# Patient Record
Sex: FEMALE | Race: WHITE | Employment: OTHER | URBAN - METROPOLITAN AREA
[De-identification: names, ages, dates, MRNs, and addresses within clinical notes are randomized per-mention and may not be internally consistent; named-entity substitution may affect disease eponyms.]

---

## 2017-03-12 PROBLEM — T82.514A HICKMAN CATHETER DYSFUNCTION (HCC): Status: ACTIVE | Noted: 2017-03-12

## 2017-05-17 PROBLEM — J18.9 HCAP (HEALTHCARE-ASSOCIATED PNEUMONIA): Status: ACTIVE | Noted: 2017-05-17

## 2017-05-17 PROBLEM — N39.0 UTI (URINARY TRACT INFECTION): Status: ACTIVE | Noted: 2017-05-17

## 2018-01-01 ENCOUNTER — HOSPITAL ENCOUNTER (INPATIENT)
Age: 23
LOS: 17 days | Discharge: NURSING, REHAB OR LONG-TERM CARE | DRG: 466 | End: 2018-09-11
Attending: EMERGENCY MEDICINE | Admitting: INTERNAL MEDICINE
Payer: MEDICAID

## 2018-01-01 ENCOUNTER — APPOINTMENT (OUTPATIENT)
Dept: GENERAL RADIOLOGY | Age: 23
DRG: 466 | End: 2018-01-01
Payer: MEDICAID

## 2018-01-01 ENCOUNTER — APPOINTMENT (OUTPATIENT)
Dept: ULTRASOUND IMAGING | Age: 23
DRG: 466 | End: 2018-01-01
Payer: MEDICAID

## 2018-01-01 ENCOUNTER — HOSPITAL ENCOUNTER (OUTPATIENT)
Dept: INTERVENTIONAL RADIOLOGY/VASCULAR | Age: 23
Discharge: HOME | End: 2018-07-13
Payer: COMMERCIAL

## 2018-01-01 ENCOUNTER — HOSPITAL ENCOUNTER (EMERGENCY)
Age: 23
End: 2018-09-19
Attending: EMERGENCY MEDICINE
Payer: MEDICAID

## 2018-01-01 VITALS
DIASTOLIC BLOOD PRESSURE: 65 MMHG | RESPIRATION RATE: 16 BRPM | SYSTOLIC BLOOD PRESSURE: 98 MMHG | HEART RATE: 97 BPM | OXYGEN SATURATION: 97 % | TEMPERATURE: 97.5 F

## 2018-01-01 VITALS
TEMPERATURE: 97.8 F | DIASTOLIC BLOOD PRESSURE: 100 MMHG | BODY MASS INDEX: 15.3 KG/M2 | WEIGHT: 97.5 LBS | RESPIRATION RATE: 16 BRPM | OXYGEN SATURATION: 98 % | HEART RATE: 99 BPM | SYSTOLIC BLOOD PRESSURE: 134 MMHG | HEIGHT: 67 IN

## 2018-01-01 DIAGNOSIS — I46.9 CARDIAC ARREST (HCC): Primary | ICD-10-CM

## 2018-01-01 DIAGNOSIS — S31.000D WOUND OF SACRAL REGION, SUBSEQUENT ENCOUNTER: ICD-10-CM

## 2018-01-01 DIAGNOSIS — I87.8 POOR VENOUS ACCESS: ICD-10-CM

## 2018-01-01 DIAGNOSIS — N30.01 ACUTE CYSTITIS WITH HEMATURIA: Primary | ICD-10-CM

## 2018-01-01 LAB
6AM URINE: <10 NG/ML
ACETAMINOPHEN LEVEL: <5 MCG/ML (ref 10–30)
ALBUMIN SERPL-MCNC: 2.3 G/DL (ref 3.5–5.2)
ALBUMIN SERPL-MCNC: 2.8 G/DL (ref 3.5–5.2)
ALP BLD-CCNC: 68 U/L (ref 35–104)
ALP BLD-CCNC: 83 U/L (ref 35–104)
ALT SERPL-CCNC: 27 U/L (ref 0–32)
ALT SERPL-CCNC: 9 U/L (ref 0–32)
AMPHETAMINE SCREEN, URINE: NOT DETECTED
AMPHETAMINE SCREEN, URINE: NOT DETECTED
ANION GAP SERPL CALCULATED.3IONS-SCNC: 10 MMOL/L (ref 7–16)
ANION GAP SERPL CALCULATED.3IONS-SCNC: 11 MMOL/L (ref 7–16)
ANION GAP SERPL CALCULATED.3IONS-SCNC: 11 MMOL/L (ref 7–16)
ANION GAP SERPL CALCULATED.3IONS-SCNC: 12 MMOL/L (ref 7–16)
ANISOCYTOSIS: ABNORMAL
AST SERPL-CCNC: 12 U/L (ref 0–31)
AST SERPL-CCNC: 25 U/L (ref 0–31)
BACTERIA: ABNORMAL /HPF
BARBITURATE SCREEN URINE: NOT DETECTED
BARBITURATE SCREEN URINE: NOT DETECTED
BASOPHILS ABSOLUTE: 0.01 E9/L (ref 0–0.2)
BASOPHILS ABSOLUTE: 0.03 E9/L (ref 0–0.2)
BASOPHILS ABSOLUTE: 0.03 E9/L (ref 0–0.2)
BASOPHILS ABSOLUTE: 0.04 E9/L (ref 0–0.2)
BASOPHILS ABSOLUTE: 0.04 E9/L (ref 0–0.2)
BASOPHILS RELATIVE PERCENT: 0.1 % (ref 0–2)
BASOPHILS RELATIVE PERCENT: 0.3 % (ref 0–2)
BASOPHILS RELATIVE PERCENT: 0.4 % (ref 0–2)
BASOPHILS RELATIVE PERCENT: 0.5 % (ref 0–2)
BASOPHILS RELATIVE PERCENT: 0.5 % (ref 0–2)
BENZODIAZEPINE SCREEN, URINE: NOT DETECTED
BENZODIAZEPINE SCREEN, URINE: NOT DETECTED
BILIRUB SERPL-MCNC: 0.3 MG/DL (ref 0–1.2)
BILIRUB SERPL-MCNC: <0.2 MG/DL (ref 0–1.2)
BILIRUBIN URINE: NEGATIVE
BLOOD CULTURE, ROUTINE: NORMAL
BLOOD, URINE: ABNORMAL
BUN BLDV-MCNC: 15 MG/DL (ref 6–20)
BUN BLDV-MCNC: 17 MG/DL (ref 6–20)
BUN BLDV-MCNC: 20 MG/DL (ref 6–20)
BUN BLDV-MCNC: 22 MG/DL (ref 6–20)
C-REACTIVE PROTEIN: 4.2 MG/DL (ref 0–0.4)
C-REACTIVE PROTEIN: 6 MG/DL (ref 0–0.4)
CALCIUM SERPL-MCNC: 8.6 MG/DL (ref 8.6–10.2)
CALCIUM SERPL-MCNC: 8.8 MG/DL (ref 8.6–10.2)
CALCIUM SERPL-MCNC: 8.8 MG/DL (ref 8.6–10.2)
CALCIUM SERPL-MCNC: 9 MG/DL (ref 8.6–10.2)
CANNABINOID SCREEN URINE: NOT DETECTED
CANNABINOID SCREEN URINE: NOT DETECTED
CHLORIDE BLD-SCNC: 101 MMOL/L (ref 98–107)
CHLORIDE BLD-SCNC: 101 MMOL/L (ref 98–107)
CHLORIDE BLD-SCNC: 103 MMOL/L (ref 98–107)
CHLORIDE BLD-SCNC: 97 MMOL/L (ref 98–107)
CLARITY: ABNORMAL
CO2: 24 MMOL/L (ref 22–29)
CO2: 25 MMOL/L (ref 22–29)
CO2: 26 MMOL/L (ref 22–29)
CO2: 28 MMOL/L (ref 22–29)
COCAINE METABOLITE SCREEN URINE: NOT DETECTED
COCAINE METABOLITE SCREEN URINE: NOT DETECTED
CODEINE, URINE: <20 NG/ML
COLOR: ABNORMAL
CORTISOL TOTAL: 2.94 MCG/DL (ref 2.68–18.4)
CORTISOL TOTAL: 20.05 MCG/DL (ref 2.68–18.4)
CREAT SERPL-MCNC: 0.3 MG/DL (ref 0.5–1)
CREAT SERPL-MCNC: 0.4 MG/DL (ref 0.5–1)
CULTURE, BLOOD 2: NORMAL
D DIMER: 642 NG/ML DDU
EKG ATRIAL RATE: 84 BPM
EKG P AXIS: 77 DEGREES
EKG P-R INTERVAL: 124 MS
EKG Q-T INTERVAL: 344 MS
EKG QRS DURATION: 76 MS
EKG QTC CALCULATION (BAZETT): 406 MS
EKG R AXIS: 70 DEGREES
EKG T AXIS: 92 DEGREES
EKG VENTRICULAR RATE: 84 BPM
EOSINOPHILS ABSOLUTE: 0 E9/L (ref 0.05–0.5)
EOSINOPHILS ABSOLUTE: 0.18 E9/L (ref 0.05–0.5)
EOSINOPHILS ABSOLUTE: 0.24 E9/L (ref 0.05–0.5)
EOSINOPHILS ABSOLUTE: 0.28 E9/L (ref 0.05–0.5)
EOSINOPHILS ABSOLUTE: 0.31 E9/L (ref 0.05–0.5)
EOSINOPHILS RELATIVE PERCENT: 0 % (ref 0–6)
EOSINOPHILS RELATIVE PERCENT: 2.2 % (ref 0–6)
EOSINOPHILS RELATIVE PERCENT: 2.8 % (ref 0–6)
EOSINOPHILS RELATIVE PERCENT: 3.2 % (ref 0–6)
EOSINOPHILS RELATIVE PERCENT: 3.5 % (ref 0–6)
ETHANOL: <10 MG/DL (ref 0–0.08)
FERRITIN: 457 NG/ML
FOLATE: 7.2 NG/ML (ref 4.8–24.2)
GFR AFRICAN AMERICAN: >60
GFR NON-AFRICAN AMERICAN: >60 ML/MIN/1.73
GLUCOSE BLD-MCNC: 113 MG/DL (ref 74–109)
GLUCOSE BLD-MCNC: 81 MG/DL (ref 74–109)
GLUCOSE BLD-MCNC: 94 MG/DL (ref 74–109)
GLUCOSE BLD-MCNC: 95 MG/DL (ref 74–109)
GLUCOSE URINE: NEGATIVE MG/DL
HCT VFR BLD CALC: 28.4 % (ref 34–48)
HCT VFR BLD CALC: 30.5 % (ref 34–48)
HCT VFR BLD CALC: 32.6 % (ref 34–48)
HCT VFR BLD CALC: 33.3 % (ref 34–48)
HCT VFR BLD CALC: 36.5 % (ref 34–48)
HCT VFR BLD CALC: 36.8 % (ref 34–48)
HEMOGLOBIN: 10.7 G/DL (ref 11.5–15.5)
HEMOGLOBIN: 10.8 G/DL (ref 11.5–15.5)
HEMOGLOBIN: 8.3 G/DL (ref 11.5–15.5)
HEMOGLOBIN: 8.9 G/DL (ref 11.5–15.5)
HEMOGLOBIN: 9.6 G/DL (ref 11.5–15.5)
HEMOGLOBIN: 9.9 G/DL (ref 11.5–15.5)
HYDROCODONE, URINE: <20 NG/ML
HYDROMORPHONE, URINE: <20 NG/ML
HYPOCHROMIA: ABNORMAL
IMMATURE GRANULOCYTES #: 0.02 E9/L
IMMATURE GRANULOCYTES #: 0.02 E9/L
IMMATURE GRANULOCYTES #: 0.03 E9/L
IMMATURE GRANULOCYTES #: 0.03 E9/L
IMMATURE GRANULOCYTES #: 0.04 E9/L
IMMATURE GRANULOCYTES %: 0.2 % (ref 0–5)
IMMATURE GRANULOCYTES %: 0.2 % (ref 0–5)
IMMATURE GRANULOCYTES %: 0.3 % (ref 0–5)
IMMATURE GRANULOCYTES %: 0.4 % (ref 0–5)
IMMATURE GRANULOCYTES %: 0.4 % (ref 0–5)
IRON SATURATION: 19 % (ref 15–50)
IRON: 44 MCG/DL (ref 37–145)
KETONES, URINE: NEGATIVE MG/DL
LACTIC ACID: 1.9 MMOL/L (ref 0.5–2.2)
LEUKOCYTE ESTERASE, URINE: ABNORMAL
LV EF: 55 %
LVEF MODALITY: NORMAL
LYMPHOCYTES ABSOLUTE: 2.41 E9/L (ref 1.5–4)
LYMPHOCYTES ABSOLUTE: 2.89 E9/L (ref 1.5–4)
LYMPHOCYTES ABSOLUTE: 2.93 E9/L (ref 1.5–4)
LYMPHOCYTES ABSOLUTE: 3.13 E9/L (ref 1.5–4)
LYMPHOCYTES ABSOLUTE: 3.14 E9/L (ref 1.5–4)
LYMPHOCYTES RELATIVE PERCENT: 23.2 % (ref 20–42)
LYMPHOCYTES RELATIVE PERCENT: 32.6 % (ref 20–42)
LYMPHOCYTES RELATIVE PERCENT: 33.3 % (ref 20–42)
LYMPHOCYTES RELATIVE PERCENT: 37 % (ref 20–42)
LYMPHOCYTES RELATIVE PERCENT: 39.1 % (ref 20–42)
MAGNESIUM: 2 MG/DL (ref 1.6–2.6)
MCH RBC QN AUTO: 25.1 PG (ref 26–35)
MCH RBC QN AUTO: 25.4 PG (ref 26–35)
MCH RBC QN AUTO: 25.5 PG (ref 26–35)
MCH RBC QN AUTO: 25.7 PG (ref 26–35)
MCH RBC QN AUTO: 25.7 PG (ref 26–35)
MCH RBC QN AUTO: 25.9 PG (ref 26–35)
MCHC RBC AUTO-ENTMCNC: 28.8 % (ref 32–34.5)
MCHC RBC AUTO-ENTMCNC: 29.1 % (ref 32–34.5)
MCHC RBC AUTO-ENTMCNC: 29.2 % (ref 32–34.5)
MCHC RBC AUTO-ENTMCNC: 29.2 % (ref 32–34.5)
MCHC RBC AUTO-ENTMCNC: 29.6 % (ref 32–34.5)
MCHC RBC AUTO-ENTMCNC: 30.4 % (ref 32–34.5)
MCV RBC AUTO: 84 FL (ref 80–99.9)
MCV RBC AUTO: 86.7 FL (ref 80–99.9)
MCV RBC AUTO: 86.9 FL (ref 80–99.9)
MCV RBC AUTO: 87.2 FL (ref 80–99.9)
MCV RBC AUTO: 88.2 FL (ref 80–99.9)
MCV RBC AUTO: 88.5 FL (ref 80–99.9)
METHADONE SCREEN, URINE: NOT DETECTED
METHADONE SCREEN, URINE: NOT DETECTED
MONOCYTES ABSOLUTE: 0.35 E9/L (ref 0.1–0.95)
MONOCYTES ABSOLUTE: 0.44 E9/L (ref 0.1–0.95)
MONOCYTES ABSOLUTE: 0.54 E9/L (ref 0.1–0.95)
MONOCYTES ABSOLUTE: 0.58 E9/L (ref 0.1–0.95)
MONOCYTES ABSOLUTE: 0.73 E9/L (ref 0.1–0.95)
MONOCYTES RELATIVE PERCENT: 4.2 % (ref 2–12)
MONOCYTES RELATIVE PERCENT: 4.4 % (ref 2–12)
MONOCYTES RELATIVE PERCENT: 6 % (ref 2–12)
MONOCYTES RELATIVE PERCENT: 6.7 % (ref 2–12)
MONOCYTES RELATIVE PERCENT: 8.6 % (ref 2–12)
MORPHINE URINE: <20 NG/ML
NEUTROPHILS ABSOLUTE: 4.3 E9/L (ref 1.8–7.3)
NEUTROPHILS ABSOLUTE: 4.32 E9/L (ref 1.8–7.3)
NEUTROPHILS ABSOLUTE: 4.88 E9/L (ref 1.8–7.3)
NEUTROPHILS ABSOLUTE: 5.14 E9/L (ref 1.8–7.3)
NEUTROPHILS ABSOLUTE: 7.47 E9/L (ref 1.8–7.3)
NEUTROPHILS RELATIVE PERCENT: 50.7 % (ref 43–80)
NEUTROPHILS RELATIVE PERCENT: 53.7 % (ref 43–80)
NEUTROPHILS RELATIVE PERCENT: 56.1 % (ref 43–80)
NEUTROPHILS RELATIVE PERCENT: 57.3 % (ref 43–80)
NEUTROPHILS RELATIVE PERCENT: 72.1 % (ref 43–80)
NITRITE, URINE: POSITIVE
NORHYDROCODONE, URINE: <20 NG/ML
NOROXYCODONE, URINE: 424 NG/ML
NOROXYMORPHONE, URINE: 115 NG/ML
OPIATE SCREEN URINE: POSITIVE
OPIATE SCREEN URINE: POSITIVE
ORGANISM: ABNORMAL
OXYCODONE, URINE CONFIRMATION: 91 NG/ML
OXYMORPHONE, URINE: <20 NG/ML
PATHOLOGIST REVIEW: NORMAL
PDW BLD-RTO: 17.6 FL (ref 11.5–15)
PDW BLD-RTO: 17.7 FL (ref 11.5–15)
PDW BLD-RTO: 17.7 FL (ref 11.5–15)
PDW BLD-RTO: 17.8 FL (ref 11.5–15)
PDW BLD-RTO: 17.8 FL (ref 11.5–15)
PDW BLD-RTO: 17.9 FL (ref 11.5–15)
PH UA: 6.5 (ref 5–9)
PHENCYCLIDINE SCREEN URINE: NOT DETECTED
PHENCYCLIDINE SCREEN URINE: NOT DETECTED
PLATELET # BLD: 373 E9/L (ref 130–450)
PLATELET # BLD: 399 E9/L (ref 130–450)
PLATELET # BLD: 426 E9/L (ref 130–450)
PLATELET # BLD: 458 E9/L (ref 130–450)
PLATELET # BLD: 469 E9/L (ref 130–450)
PLATELET # BLD: 526 E9/L (ref 130–450)
PMV BLD AUTO: 8.5 FL (ref 7–12)
PMV BLD AUTO: 8.6 FL (ref 7–12)
PMV BLD AUTO: 8.9 FL (ref 7–12)
PMV BLD AUTO: 8.9 FL (ref 7–12)
PMV BLD AUTO: 9 FL (ref 7–12)
PMV BLD AUTO: 9 FL (ref 7–12)
POTASSIUM REFLEX MAGNESIUM: 4.6 MMOL/L (ref 3.5–5)
POTASSIUM SERPL-SCNC: 3.4 MMOL/L (ref 3.5–5)
POTASSIUM SERPL-SCNC: 3.9 MMOL/L (ref 3.5–5)
POTASSIUM SERPL-SCNC: 4.9 MMOL/L (ref 3.5–5)
PROCALCITONIN: 0.04 NG/ML (ref 0–0.08)
PROPOXYPHENE SCREEN: NOT DETECTED
PROPOXYPHENE SCREEN: NOT DETECTED
PROTEIN UA: 100 MG/DL
RBC # BLD: 3.21 E12/L (ref 3.5–5.5)
RBC # BLD: 3.51 E12/L (ref 3.5–5.5)
RBC # BLD: 3.82 E12/L (ref 3.5–5.5)
RBC # BLD: 3.88 E12/L (ref 3.5–5.5)
RBC # BLD: 4.17 E12/L (ref 3.5–5.5)
RBC # BLD: 4.21 E12/L (ref 3.5–5.5)
RBC UA: >20 /HPF (ref 0–2)
SALICYLATE, SERUM: <0.3 MG/DL (ref 0–30)
SEDIMENTATION RATE, ERYTHROCYTE: 118 MM/HR (ref 0–20)
SEDIMENTATION RATE, ERYTHROCYTE: 132 MM/HR (ref 0–20)
SODIUM BLD-SCNC: 135 MMOL/L (ref 132–146)
SODIUM BLD-SCNC: 138 MMOL/L (ref 132–146)
SPECIFIC GRAVITY UA: 1.02 (ref 1–1.03)
TOTAL IRON BINDING CAPACITY: 231 MCG/DL (ref 250–450)
TOTAL PROTEIN: 7.2 G/DL (ref 6.4–8.3)
TOTAL PROTEIN: 8.1 G/DL (ref 6.4–8.3)
TRICYCLIC ANTIDEPRESSANTS SCREEN SERUM: NEGATIVE NG/ML
URINE CULTURE, ROUTINE: ABNORMAL
UROBILINOGEN, URINE: 0.2 E.U./DL
VANCOMYCIN RANDOM: 13.2 MCG/ML (ref 5–40)
VITAMIN B-12: 416 PG/ML (ref 211–946)
VRE CULTURE: ABNORMAL
VRE CULTURE: ABNORMAL
WBC # BLD: 10.4 E9/L (ref 4.5–11.5)
WBC # BLD: 13.2 E9/L (ref 4.5–11.5)
WBC # BLD: 8 E9/L (ref 4.5–11.5)
WBC # BLD: 8.5 E9/L (ref 4.5–11.5)
WBC # BLD: 8.7 E9/L (ref 4.5–11.5)
WBC # BLD: 9 E9/L (ref 4.5–11.5)
WBC UA: >20 /HPF (ref 0–5)
WOUND/ABSCESS: ABNORMAL
WOUND/ABSCESS: NORMAL
WOUND/ABSCESS: NORMAL

## 2018-01-01 PROCEDURE — 2580000003 HC RX 258: Performed by: INTERNAL MEDICINE

## 2018-01-01 PROCEDURE — 6370000000 HC RX 637 (ALT 250 FOR IP)

## 2018-01-01 PROCEDURE — 99232 SBSQ HOSP IP/OBS MODERATE 35: CPT | Performed by: INTERNAL MEDICINE

## 2018-01-01 PROCEDURE — 82728 ASSAY OF FERRITIN: CPT

## 2018-01-01 PROCEDURE — 80307 DRUG TEST PRSMV CHEM ANLYZR: CPT

## 2018-01-01 PROCEDURE — 2580000003 HC RX 258

## 2018-01-01 PROCEDURE — 99232 SBSQ HOSP IP/OBS MODERATE 35: CPT | Performed by: HOSPITALIST

## 2018-01-01 PROCEDURE — 82607 VITAMIN B-12: CPT

## 2018-01-01 PROCEDURE — 6370000000 HC RX 637 (ALT 250 FOR IP): Performed by: HOSPITALIST

## 2018-01-01 PROCEDURE — 6360000002 HC RX W HCPCS: Performed by: NURSE PRACTITIONER

## 2018-01-01 PROCEDURE — 6370000000 HC RX 637 (ALT 250 FOR IP): Performed by: STUDENT IN AN ORGANIZED HEALTH CARE EDUCATION/TRAINING PROGRAM

## 2018-01-01 PROCEDURE — 6370000000 HC RX 637 (ALT 250 FOR IP): Performed by: SPECIALIST

## 2018-01-01 PROCEDURE — APPSS30 APP SPLIT SHARED TIME 16-30 MINUTES: Performed by: NURSE PRACTITIONER

## 2018-01-01 PROCEDURE — 1200000000 HC SEMI PRIVATE

## 2018-01-01 PROCEDURE — 2580000003 HC RX 258: Performed by: HOSPITALIST

## 2018-01-01 PROCEDURE — 84145 PROCALCITONIN (PCT): CPT

## 2018-01-01 PROCEDURE — 6370000000 HC RX 637 (ALT 250 FOR IP): Performed by: INTERNAL MEDICINE

## 2018-01-01 PROCEDURE — 36415 COLL VENOUS BLD VENIPUNCTURE: CPT

## 2018-01-01 PROCEDURE — 87040 BLOOD CULTURE FOR BACTERIA: CPT

## 2018-01-01 PROCEDURE — 2500000003 HC RX 250 WO HCPCS

## 2018-01-01 PROCEDURE — 99233 SBSQ HOSP IP/OBS HIGH 50: CPT | Performed by: INTERNAL MEDICINE

## 2018-01-01 PROCEDURE — 2580000003 HC RX 258: Performed by: NURSE PRACTITIONER

## 2018-01-01 PROCEDURE — 87077 CULTURE AEROBIC IDENTIFY: CPT

## 2018-01-01 PROCEDURE — 6360000002 HC RX W HCPCS

## 2018-01-01 PROCEDURE — 93005 ELECTROCARDIOGRAM TRACING: CPT | Performed by: NURSE PRACTITIONER

## 2018-01-01 PROCEDURE — G0480 DRUG TEST DEF 1-7 CLASSES: HCPCS

## 2018-01-01 PROCEDURE — 80053 COMPREHEN METABOLIC PANEL: CPT

## 2018-01-01 PROCEDURE — 97161 PT EVAL LOW COMPLEX 20 MIN: CPT

## 2018-01-01 PROCEDURE — 85651 RBC SED RATE NONAUTOMATED: CPT

## 2018-01-01 PROCEDURE — 87070 CULTURE OTHR SPECIMN AEROBIC: CPT

## 2018-01-01 PROCEDURE — 6370000000 HC RX 637 (ALT 250 FOR IP): Performed by: EMERGENCY MEDICINE

## 2018-01-01 PROCEDURE — 6360000002 HC RX W HCPCS: Performed by: STUDENT IN AN ORGANIZED HEALTH CARE EDUCATION/TRAINING PROGRAM

## 2018-01-01 PROCEDURE — 80048 BASIC METABOLIC PNL TOTAL CA: CPT

## 2018-01-01 PROCEDURE — 81001 URINALYSIS AUTO W/SCOPE: CPT

## 2018-01-01 PROCEDURE — 85025 COMPLETE CBC W/AUTO DIFF WBC: CPT

## 2018-01-01 PROCEDURE — 97165 OT EVAL LOW COMPLEX 30 MIN: CPT

## 2018-01-01 PROCEDURE — 83605 ASSAY OF LACTIC ACID: CPT

## 2018-01-01 PROCEDURE — 6360000002 HC RX W HCPCS: Performed by: INTERNAL MEDICINE

## 2018-01-01 PROCEDURE — 36556 INSERT NON-TUNNEL CV CATH: CPT | Performed by: RADIOLOGY

## 2018-01-01 PROCEDURE — 85027 COMPLETE CBC AUTOMATED: CPT

## 2018-01-01 PROCEDURE — 83550 IRON BINDING TEST: CPT

## 2018-01-01 PROCEDURE — 83735 ASSAY OF MAGNESIUM: CPT

## 2018-01-01 PROCEDURE — C1751 CATH, INF, PER/CENT/MIDLINE: HCPCS

## 2018-01-01 PROCEDURE — 2580000003 HC RX 258: Performed by: EMERGENCY MEDICINE

## 2018-01-01 PROCEDURE — 99223 1ST HOSP IP/OBS HIGH 75: CPT | Performed by: INTERNAL MEDICINE

## 2018-01-01 PROCEDURE — 86140 C-REACTIVE PROTEIN: CPT

## 2018-01-01 PROCEDURE — 99284 EMERGENCY DEPT VISIT MOD MDM: CPT

## 2018-01-01 PROCEDURE — 87186 SC STD MICRODIL/AGAR DIL: CPT

## 2018-01-01 PROCEDURE — 6360000002 HC RX W HCPCS: Performed by: EMERGENCY MEDICINE

## 2018-01-01 PROCEDURE — 99239 HOSP IP/OBS DSCHRG MGMT >30: CPT | Performed by: INTERNAL MEDICINE

## 2018-01-01 PROCEDURE — 93970 EXTREMITY STUDY: CPT

## 2018-01-01 PROCEDURE — 99291 CRITICAL CARE FIRST HOUR: CPT | Performed by: INTERNAL MEDICINE

## 2018-01-01 PROCEDURE — 85378 FIBRIN DEGRADE SEMIQUANT: CPT

## 2018-01-01 PROCEDURE — 82533 TOTAL CORTISOL: CPT

## 2018-01-01 PROCEDURE — 76937 US GUIDE VASCULAR ACCESS: CPT | Performed by: RADIOLOGY

## 2018-01-01 PROCEDURE — 97530 THERAPEUTIC ACTIVITIES: CPT

## 2018-01-01 PROCEDURE — 2500000003 HC RX 250 WO HCPCS: Performed by: RADIOLOGY

## 2018-01-01 PROCEDURE — 2580000003 HC RX 258: Performed by: STUDENT IN AN ORGANIZED HEALTH CARE EDUCATION/TRAINING PROGRAM

## 2018-01-01 PROCEDURE — 99999 PR OFFICE/OUTPT VISIT,PROCEDURE ONLY: CPT | Performed by: HOSPITALIST

## 2018-01-01 PROCEDURE — 99285 EMERGENCY DEPT VISIT HI MDM: CPT

## 2018-01-01 PROCEDURE — 2500000003 HC RX 250 WO HCPCS: Performed by: STUDENT IN AN ORGANIZED HEALTH CARE EDUCATION/TRAINING PROGRAM

## 2018-01-01 PROCEDURE — 77001 FLUOROGUIDE FOR VEIN DEVICE: CPT | Performed by: RADIOLOGY

## 2018-01-01 PROCEDURE — 83540 ASSAY OF IRON: CPT

## 2018-01-01 PROCEDURE — S0028 INJECTION, FAMOTIDINE, 20 MG: HCPCS | Performed by: STUDENT IN AN ORGANIZED HEALTH CARE EDUCATION/TRAINING PROGRAM

## 2018-01-01 PROCEDURE — 82746 ASSAY OF FOLIC ACID SERUM: CPT

## 2018-01-01 PROCEDURE — 99222 1ST HOSP IP/OBS MODERATE 55: CPT | Performed by: PHYSICIAN ASSISTANT

## 2018-01-01 PROCEDURE — 93306 TTE W/DOPPLER COMPLETE: CPT

## 2018-01-01 PROCEDURE — 87081 CULTURE SCREEN ONLY: CPT

## 2018-01-01 PROCEDURE — 87088 URINE BACTERIA CULTURE: CPT

## 2018-01-01 PROCEDURE — G8981 BODY POS CURRENT STATUS: HCPCS

## 2018-01-01 PROCEDURE — 71045 X-RAY EXAM CHEST 1 VIEW: CPT

## 2018-01-01 PROCEDURE — 92950 HEART/LUNG RESUSCITATION CPR: CPT

## 2018-01-01 PROCEDURE — G8982 BODY POS GOAL STATUS: HCPCS

## 2018-01-01 PROCEDURE — 99232 SBSQ HOSP IP/OBS MODERATE 35: CPT | Performed by: PHYSICIAN ASSISTANT

## 2018-01-01 PROCEDURE — 80202 ASSAY OF VANCOMYCIN: CPT

## 2018-01-01 RX ORDER — 0.9 % SODIUM CHLORIDE 0.9 %
500 INTRAVENOUS SOLUTION INTRAVENOUS ONCE
Status: COMPLETED | OUTPATIENT
Start: 2018-01-01 | End: 2018-01-01

## 2018-01-01 RX ORDER — MIDODRINE HYDROCHLORIDE 5 MG/1
5 TABLET ORAL ONCE
Status: DISCONTINUED | OUTPATIENT
Start: 2018-01-01 | End: 2018-01-01

## 2018-01-01 RX ORDER — MORPHINE SULFATE 2 MG/ML
1 INJECTION, SOLUTION INTRAMUSCULAR; INTRAVENOUS ONCE
Status: DISCONTINUED | OUTPATIENT
Start: 2018-01-01 | End: 2018-01-01

## 2018-01-01 RX ORDER — SODIUM CHLORIDE 0.9 % (FLUSH) 0.9 %
10 SYRINGE (ML) INJECTION PRN
Status: DISCONTINUED | OUTPATIENT
Start: 2018-01-01 | End: 2018-01-01 | Stop reason: HOSPADM

## 2018-01-01 RX ORDER — DIPHENHYDRAMINE HYDROCHLORIDE 50 MG/ML
25 INJECTION INTRAMUSCULAR; INTRAVENOUS ONCE
Status: COMPLETED | OUTPATIENT
Start: 2018-01-01 | End: 2018-01-01

## 2018-01-01 RX ORDER — CEFDINIR 300 MG/1
300 CAPSULE ORAL EVERY 12 HOURS SCHEDULED
Status: DISCONTINUED | OUTPATIENT
Start: 2018-01-01 | End: 2018-01-01

## 2018-01-01 RX ORDER — FOLIC ACID 1 MG/1
1 TABLET ORAL DAILY
Status: DISCONTINUED | OUTPATIENT
Start: 2018-01-01 | End: 2018-01-01 | Stop reason: HOSPADM

## 2018-01-01 RX ORDER — VENLAFAXINE 75 MG/1
75 TABLET ORAL 2 TIMES DAILY
Status: DISCONTINUED | OUTPATIENT
Start: 2018-01-01 | End: 2018-01-01 | Stop reason: HOSPADM

## 2018-01-01 RX ORDER — FERROUS SULFATE 325(65) MG
325 TABLET ORAL 2 TIMES DAILY WITH MEALS
Status: DISCONTINUED | OUTPATIENT
Start: 2018-01-01 | End: 2018-01-01

## 2018-01-01 RX ORDER — POTASSIUM CHLORIDE 7.45 MG/ML
10 INJECTION INTRAVENOUS ONCE
Status: COMPLETED | OUTPATIENT
Start: 2018-01-01 | End: 2018-01-01

## 2018-01-01 RX ORDER — DIPHENHYDRAMINE HCL 25 MG
25 TABLET ORAL EVERY 6 HOURS PRN
Qty: 30 TABLET | Refills: 0 | DISCHARGE
Start: 2018-01-01 | End: 2018-10-11

## 2018-01-01 RX ORDER — NALOXONE HYDROCHLORIDE 0.4 MG/ML
0.4 INJECTION, SOLUTION INTRAMUSCULAR; INTRAVENOUS; SUBCUTANEOUS PRN
Status: DISCONTINUED | OUTPATIENT
Start: 2018-01-01 | End: 2018-01-01 | Stop reason: HOSPADM

## 2018-01-01 RX ORDER — SODIUM CHLORIDE 9 MG/ML
INJECTION, SOLUTION INTRAVENOUS CONTINUOUS
Status: DISCONTINUED | OUTPATIENT
Start: 2018-01-01 | End: 2018-01-01

## 2018-01-01 RX ORDER — MIDODRINE HYDROCHLORIDE 5 MG/1
5 TABLET ORAL ONCE
Status: COMPLETED | OUTPATIENT
Start: 2018-01-01 | End: 2018-01-01

## 2018-01-01 RX ORDER — ACETAMINOPHEN 325 MG/1
650 TABLET ORAL EVERY 4 HOURS PRN
Status: DISCONTINUED | OUTPATIENT
Start: 2018-01-01 | End: 2018-01-01 | Stop reason: HOSPADM

## 2018-01-01 RX ORDER — METHYLPREDNISOLONE SODIUM SUCCINATE 40 MG/ML
40 INJECTION, POWDER, LYOPHILIZED, FOR SOLUTION INTRAMUSCULAR; INTRAVENOUS ONCE
Status: COMPLETED | OUTPATIENT
Start: 2018-01-01 | End: 2018-01-01

## 2018-01-01 RX ORDER — BACLOFEN 10 MG/1
10 TABLET ORAL ONCE
Status: COMPLETED | OUTPATIENT
Start: 2018-01-01 | End: 2018-01-01

## 2018-01-01 RX ORDER — M-VIT,TX,IRON,MINS/CALC/FOLIC 27MG-0.4MG
1 TABLET ORAL DAILY
Status: DISCONTINUED | OUTPATIENT
Start: 2018-01-01 | End: 2018-01-01 | Stop reason: HOSPADM

## 2018-01-01 RX ORDER — AMOXICILLIN AND CLAVULANATE POTASSIUM 400; 57 MG/5ML; MG/5ML
800 POWDER, FOR SUSPENSION ORAL EVERY 12 HOURS SCHEDULED
Status: DISCONTINUED | OUTPATIENT
Start: 2018-01-01 | End: 2018-01-01

## 2018-01-01 RX ORDER — MIDODRINE HYDROCHLORIDE 5 MG/1
5 TABLET ORAL DAILY
Status: DISCONTINUED | OUTPATIENT
Start: 2018-01-01 | End: 2018-01-01

## 2018-01-01 RX ORDER — ONDANSETRON 4 MG/1
4 TABLET, FILM COATED ORAL EVERY 4 HOURS PRN
Status: DISCONTINUED | OUTPATIENT
Start: 2018-01-01 | End: 2018-01-01 | Stop reason: HOSPADM

## 2018-01-01 RX ORDER — GABAPENTIN 400 MG/1
800 CAPSULE ORAL 3 TIMES DAILY
Status: DISCONTINUED | OUTPATIENT
Start: 2018-01-01 | End: 2018-01-01 | Stop reason: HOSPADM

## 2018-01-01 RX ORDER — DEXTROSE, SODIUM CHLORIDE, AND POTASSIUM CHLORIDE 5; .45; .15 G/100ML; G/100ML; G/100ML
INJECTION INTRAVENOUS CONTINUOUS
Status: DISCONTINUED | OUTPATIENT
Start: 2018-01-01 | End: 2018-01-01

## 2018-01-01 RX ORDER — GABAPENTIN 300 MG/1
300 CAPSULE ORAL 3 TIMES DAILY
Status: DISCONTINUED | OUTPATIENT
Start: 2018-01-01 | End: 2018-01-01

## 2018-01-01 RX ORDER — ONDANSETRON 2 MG/ML
4 INJECTION INTRAMUSCULAR; INTRAVENOUS EVERY 6 HOURS PRN
Status: DISCONTINUED | OUTPATIENT
Start: 2018-01-01 | End: 2018-01-01 | Stop reason: HOSPADM

## 2018-01-01 RX ORDER — SODIUM CHLORIDE 0.9 % (FLUSH) 0.9 %
10 SYRINGE (ML) INJECTION EVERY 12 HOURS SCHEDULED
Status: DISCONTINUED | OUTPATIENT
Start: 2018-01-01 | End: 2018-01-01 | Stop reason: SDUPTHER

## 2018-01-01 RX ORDER — OXYCODONE AND ACETAMINOPHEN 10; 325 MG/1; MG/1
1 TABLET ORAL EVERY 4 HOURS PRN
Status: DISCONTINUED | OUTPATIENT
Start: 2018-01-01 | End: 2018-01-01

## 2018-01-01 RX ORDER — ACETAMINOPHEN 325 MG/1
650 TABLET ORAL EVERY 4 HOURS PRN
Status: DISCONTINUED | OUTPATIENT
Start: 2018-01-01 | End: 2018-01-01

## 2018-01-01 RX ORDER — OXYCODONE HYDROCHLORIDE AND ACETAMINOPHEN 5; 325 MG/1; MG/1
1 TABLET ORAL EVERY 4 HOURS PRN
Status: DISCONTINUED | OUTPATIENT
Start: 2018-01-01 | End: 2018-01-01 | Stop reason: HOSPADM

## 2018-01-01 RX ORDER — SODIUM CHLORIDE 9 MG/ML
INJECTION, SOLUTION INTRAVENOUS CONTINUOUS
Status: DISCONTINUED | OUTPATIENT
Start: 2018-01-01 | End: 2018-01-01 | Stop reason: HOSPADM

## 2018-01-01 RX ORDER — MIDODRINE HYDROCHLORIDE 5 MG/1
5 TABLET ORAL 2 TIMES DAILY WITH MEALS
Status: DISCONTINUED | OUTPATIENT
Start: 2018-01-01 | End: 2018-01-01 | Stop reason: HOSPADM

## 2018-01-01 RX ORDER — SODIUM CHLORIDE 9 MG/ML
500 INJECTION, SOLUTION INTRAVENOUS CONTINUOUS
Status: DISCONTINUED | OUTPATIENT
Start: 2018-01-01 | End: 2018-01-01

## 2018-01-01 RX ORDER — AMOXICILLIN AND CLAVULANATE POTASSIUM 875; 125 MG/1; MG/1
1 TABLET, FILM COATED ORAL EVERY 12 HOURS SCHEDULED
Status: DISCONTINUED | OUTPATIENT
Start: 2018-01-01 | End: 2018-01-01

## 2018-01-01 RX ORDER — METOPROLOL SUCCINATE 25 MG/1
12.5 TABLET, EXTENDED RELEASE ORAL DAILY
Status: DISCONTINUED | OUTPATIENT
Start: 2018-01-01 | End: 2018-01-01 | Stop reason: HOSPADM

## 2018-01-01 RX ORDER — LANOLIN ALCOHOL/MO/W.PET/CERES
3 CREAM (GRAM) TOPICAL NIGHTLY
Status: DISCONTINUED | OUTPATIENT
Start: 2018-01-01 | End: 2018-01-01 | Stop reason: HOSPADM

## 2018-01-01 RX ORDER — 0.9 % SODIUM CHLORIDE 0.9 %
30 INTRAVENOUS SOLUTION INTRAVENOUS ONCE
Status: COMPLETED | OUTPATIENT
Start: 2018-01-01 | End: 2018-01-01

## 2018-01-01 RX ORDER — CEFDINIR 250 MG/5ML
300 POWDER, FOR SUSPENSION ORAL DAILY
Status: DISCONTINUED | OUTPATIENT
Start: 2018-01-01 | End: 2018-01-01

## 2018-01-01 RX ORDER — SODIUM CHLORIDE 0.9 % (FLUSH) 0.9 %
10 SYRINGE (ML) INJECTION PRN
Status: DISCONTINUED | OUTPATIENT
Start: 2018-01-01 | End: 2018-01-01 | Stop reason: SDUPTHER

## 2018-01-01 RX ORDER — DIPHENHYDRAMINE HCL 25 MG
25 TABLET ORAL EVERY 6 HOURS PRN
Status: DISCONTINUED | OUTPATIENT
Start: 2018-01-01 | End: 2018-01-01 | Stop reason: HOSPADM

## 2018-01-01 RX ORDER — HYDROXYZINE HYDROCHLORIDE 10 MG/1
50 TABLET, FILM COATED ORAL NIGHTLY PRN
Status: DISCONTINUED | OUTPATIENT
Start: 2018-01-01 | End: 2018-01-01 | Stop reason: SDUPTHER

## 2018-01-01 RX ORDER — NICOTINE 21 MG/24HR
1 PATCH, TRANSDERMAL 24 HOURS TRANSDERMAL DAILY
Status: DISCONTINUED | OUTPATIENT
Start: 2018-01-01 | End: 2018-01-01 | Stop reason: HOSPADM

## 2018-01-01 RX ORDER — OXYCODONE HYDROCHLORIDE AND ACETAMINOPHEN 5; 325 MG/1; MG/1
1 TABLET ORAL EVERY 6 HOURS PRN
Qty: 24 TABLET | Refills: 0 | Status: SHIPPED | OUTPATIENT
Start: 2018-01-01 | End: 2018-01-01

## 2018-01-01 RX ORDER — HYDROXYZINE PAMOATE 25 MG/1
50 CAPSULE ORAL NIGHTLY PRN
Status: DISCONTINUED | OUTPATIENT
Start: 2018-01-01 | End: 2018-01-01 | Stop reason: HOSPADM

## 2018-01-01 RX ORDER — AMOXICILLIN AND CLAVULANATE POTASSIUM 875; 125 MG/1; MG/1
1 TABLET, FILM COATED ORAL EVERY 12 HOURS SCHEDULED
Qty: 14 TABLET | Refills: 0 | DISCHARGE
Start: 2018-01-01 | End: 2018-01-01 | Stop reason: HOSPADM

## 2018-01-01 RX ORDER — METHYLPREDNISOLONE SODIUM SUCCINATE 125 MG/2ML
125 INJECTION, POWDER, LYOPHILIZED, FOR SOLUTION INTRAMUSCULAR; INTRAVENOUS ONCE
Status: COMPLETED | OUTPATIENT
Start: 2018-01-01 | End: 2018-01-01

## 2018-01-01 RX ORDER — OXYCODONE HYDROCHLORIDE AND ACETAMINOPHEN 5; 325 MG/1; MG/1
1 TABLET ORAL ONCE
Status: COMPLETED | OUTPATIENT
Start: 2018-01-01 | End: 2018-01-01

## 2018-01-01 RX ORDER — FLUDROCORTISONE ACETATE 0.1 MG/1
0.1 TABLET ORAL DAILY
Status: DISCONTINUED | OUTPATIENT
Start: 2018-01-01 | End: 2018-01-01

## 2018-01-01 RX ORDER — MORPHINE SULFATE 2 MG/ML
0.5 INJECTION, SOLUTION INTRAMUSCULAR; INTRAVENOUS EVERY 4 HOURS PRN
Status: DISCONTINUED | OUTPATIENT
Start: 2018-01-01 | End: 2018-01-01

## 2018-01-01 RX ORDER — LEVETIRACETAM 500 MG/1
1000 TABLET ORAL 2 TIMES DAILY
Status: DISCONTINUED | OUTPATIENT
Start: 2018-01-01 | End: 2018-01-01 | Stop reason: HOSPADM

## 2018-01-01 RX ORDER — SODIUM CHLORIDE 0.9 % (FLUSH) 0.9 %
10 SYRINGE (ML) INJECTION EVERY 12 HOURS SCHEDULED
Status: DISCONTINUED | OUTPATIENT
Start: 2018-01-01 | End: 2018-01-01 | Stop reason: HOSPADM

## 2018-01-01 RX ORDER — MORPHINE SULFATE 2 MG/ML
1 INJECTION, SOLUTION INTRAMUSCULAR; INTRAVENOUS EVERY 4 HOURS PRN
Status: DISCONTINUED | OUTPATIENT
Start: 2018-01-01 | End: 2018-01-01

## 2018-01-01 RX ORDER — FAMOTIDINE 20 MG/1
20 TABLET, FILM COATED ORAL 2 TIMES DAILY
Status: DISCONTINUED | OUTPATIENT
Start: 2018-01-01 | End: 2018-01-01 | Stop reason: HOSPADM

## 2018-01-01 RX ORDER — CEFDINIR 300 MG/1
300 CAPSULE ORAL EVERY 12 HOURS SCHEDULED
Qty: 14 CAPSULE | Refills: 0 | DISCHARGE
Start: 2018-01-01 | End: 2018-01-01 | Stop reason: HOSPADM

## 2018-01-01 RX ORDER — ALPRAZOLAM 1 MG/1
1 TABLET ORAL 3 TIMES DAILY PRN
Status: DISCONTINUED | OUTPATIENT
Start: 2018-01-01 | End: 2018-01-01 | Stop reason: HOSPADM

## 2018-01-01 RX ORDER — 0.9 % SODIUM CHLORIDE 0.9 %
1000 INTRAVENOUS SOLUTION INTRAVENOUS ONCE
Status: COMPLETED | OUTPATIENT
Start: 2018-01-01 | End: 2018-01-01

## 2018-01-01 RX ORDER — OXYBUTYNIN CHLORIDE 10 MG/1
10 TABLET, EXTENDED RELEASE ORAL DAILY
COMMUNITY

## 2018-01-01 RX ADMIN — MULTIPLE VITAMINS W/ MINERALS TAB 1 TABLET: TAB at 09:05

## 2018-01-01 RX ADMIN — Medication 10 ML: at 20:50

## 2018-01-01 RX ADMIN — METOPROLOL SUCCINATE 12.5 MG: 25 TABLET, EXTENDED RELEASE ORAL at 11:42

## 2018-01-01 RX ADMIN — GABAPENTIN 800 MG: 400 CAPSULE ORAL at 13:07

## 2018-01-01 RX ADMIN — SODIUM CHLORIDE 500 ML: 9 INJECTION, SOLUTION INTRAVENOUS at 20:03

## 2018-01-01 RX ADMIN — MELATONIN 3 MG ORAL TABLET 3 MG: 3 TABLET ORAL at 21:20

## 2018-01-01 RX ADMIN — VENLAFAXINE 75 MG: 75 TABLET ORAL at 21:19

## 2018-01-01 RX ADMIN — VENLAFAXINE 75 MG: 75 TABLET ORAL at 09:30

## 2018-01-01 RX ADMIN — OXYCODONE AND ACETAMINOPHEN 1 TABLET: 10; 325 TABLET ORAL at 03:16

## 2018-01-01 RX ADMIN — DIPHENHYDRAMINE HCL 25 MG: 25 TABLET ORAL at 15:58

## 2018-01-01 RX ADMIN — FERROUS SULFATE TAB 325 MG (65 MG ELEMENTAL FE) 325 MG: 325 (65 FE) TAB at 08:55

## 2018-01-01 RX ADMIN — OXYCODONE AND ACETAMINOPHEN 1 TABLET: 10; 325 TABLET ORAL at 10:27

## 2018-01-01 RX ADMIN — LEVETIRACETAM 1000 MG: 500 TABLET, FILM COATED ORAL at 10:03

## 2018-01-01 RX ADMIN — GABAPENTIN 800 MG: 400 CAPSULE ORAL at 20:50

## 2018-01-01 RX ADMIN — ALPRAZOLAM 1 MG: 1 TABLET ORAL at 13:13

## 2018-01-01 RX ADMIN — VENLAFAXINE 75 MG: 75 TABLET ORAL at 09:23

## 2018-01-01 RX ADMIN — DIPHENHYDRAMINE HCL 25 MG: 25 TABLET ORAL at 01:58

## 2018-01-01 RX ADMIN — FOLIC ACID 1 MG: 1 TABLET ORAL at 11:43

## 2018-01-01 RX ADMIN — SODIUM CHLORIDE: 9 INJECTION, SOLUTION INTRAVENOUS at 09:18

## 2018-01-01 RX ADMIN — OXYCODONE HYDROCHLORIDE AND ACETAMINOPHEN 1 TABLET: 5; 325 TABLET ORAL at 23:57

## 2018-01-01 RX ADMIN — METOPROLOL SUCCINATE 12.5 MG: 25 TABLET, EXTENDED RELEASE ORAL at 09:07

## 2018-01-01 RX ADMIN — FAMOTIDINE 20 MG: 20 TABLET ORAL at 22:32

## 2018-01-01 RX ADMIN — GABAPENTIN 800 MG: 400 CAPSULE ORAL at 21:19

## 2018-01-01 RX ADMIN — AMOXICILLIN AND CLAVULANATE POTASSIUM 800 MG: 400; 57 POWDER, FOR SUSPENSION ORAL at 22:29

## 2018-01-01 RX ADMIN — METOPROLOL SUCCINATE 12.5 MG: 25 TABLET, EXTENDED RELEASE ORAL at 08:44

## 2018-01-01 RX ADMIN — OXYCODONE AND ACETAMINOPHEN 1 TABLET: 10; 325 TABLET ORAL at 14:30

## 2018-01-01 RX ADMIN — GABAPENTIN 800 MG: 400 CAPSULE ORAL at 14:38

## 2018-01-01 RX ADMIN — OXYCODONE HYDROCHLORIDE AND ACETAMINOPHEN 1 TABLET: 5; 325 TABLET ORAL at 09:28

## 2018-01-01 RX ADMIN — OXYCODONE AND ACETAMINOPHEN 1 TABLET: 10; 325 TABLET ORAL at 15:11

## 2018-01-01 RX ADMIN — Medication 10 ML: at 00:49

## 2018-01-01 RX ADMIN — ONDANSETRON HYDROCHLORIDE 4 MG: 2 INJECTION, SOLUTION INTRAMUSCULAR; INTRAVENOUS at 15:58

## 2018-01-01 RX ADMIN — DIPHENHYDRAMINE HYDROCHLORIDE 25 MG: 50 INJECTION, SOLUTION INTRAMUSCULAR; INTRAVENOUS at 18:51

## 2018-01-01 RX ADMIN — AMOXICILLIN AND CLAVULANATE POTASSIUM 1 TABLET: 875; 125 TABLET, FILM COATED ORAL at 09:15

## 2018-01-01 RX ADMIN — VENLAFAXINE 75 MG: 75 TABLET ORAL at 21:57

## 2018-01-01 RX ADMIN — GABAPENTIN 800 MG: 400 CAPSULE ORAL at 14:30

## 2018-01-01 RX ADMIN — METOPROLOL SUCCINATE 12.5 MG: 25 TABLET, EXTENDED RELEASE ORAL at 09:55

## 2018-01-01 RX ADMIN — FAMOTIDINE 20 MG: 20 TABLET ORAL at 09:29

## 2018-01-01 RX ADMIN — Medication 10 ML: at 09:34

## 2018-01-01 RX ADMIN — GABAPENTIN 800 MG: 400 CAPSULE ORAL at 14:50

## 2018-01-01 RX ADMIN — SODIUM CHLORIDE: 9 INJECTION, SOLUTION INTRAVENOUS at 06:49

## 2018-01-01 RX ADMIN — MIDODRINE HYDROCHLORIDE 5 MG: 5 TABLET ORAL at 09:07

## 2018-01-01 RX ADMIN — SODIUM CHLORIDE 1362 ML: 9 INJECTION, SOLUTION INTRAVENOUS at 14:30

## 2018-01-01 RX ADMIN — FOLIC ACID 1 MG: 1 TABLET ORAL at 08:55

## 2018-01-01 RX ADMIN — FOLIC ACID 1 MG: 1 TABLET ORAL at 09:29

## 2018-01-01 RX ADMIN — LEVETIRACETAM 1000 MG: 500 TABLET, FILM COATED ORAL at 09:14

## 2018-01-01 RX ADMIN — MULTIPLE VITAMINS W/ MINERALS TAB 1 TABLET: TAB at 08:50

## 2018-01-01 RX ADMIN — LEVETIRACETAM 1000 MG: 500 TABLET, FILM COATED ORAL at 14:06

## 2018-01-01 RX ADMIN — MORPHINE SULFATE 1 MG: 2 INJECTION, SOLUTION INTRAMUSCULAR; INTRAVENOUS at 17:41

## 2018-01-01 RX ADMIN — Medication 10 ML: at 09:16

## 2018-01-01 RX ADMIN — CEFDINIR 300 MG: 300 CAPSULE ORAL at 22:13

## 2018-01-01 RX ADMIN — FAMOTIDINE 20 MG: 20 TABLET ORAL at 08:09

## 2018-01-01 RX ADMIN — ALPRAZOLAM 1 MG: 1 TABLET ORAL at 22:13

## 2018-01-01 RX ADMIN — FAMOTIDINE 20 MG: 20 TABLET ORAL at 20:30

## 2018-01-01 RX ADMIN — GABAPENTIN 800 MG: 400 CAPSULE ORAL at 20:26

## 2018-01-01 RX ADMIN — Medication 10 ML: at 08:56

## 2018-01-01 RX ADMIN — LEVETIRACETAM 1000 MG: 500 TABLET, FILM COATED ORAL at 09:22

## 2018-01-01 RX ADMIN — PIPERACILLIN SODIUM AND TAZOBACTAM SODIUM 3.38 G: 3; .375 INJECTION, POWDER, LYOPHILIZED, FOR SOLUTION INTRAVENOUS at 05:17

## 2018-01-01 RX ADMIN — MIDODRINE HYDROCHLORIDE 5 MG: 5 TABLET ORAL at 09:54

## 2018-01-01 RX ADMIN — GABAPENTIN 800 MG: 400 CAPSULE ORAL at 15:11

## 2018-01-01 RX ADMIN — OXYCODONE AND ACETAMINOPHEN 1 TABLET: 10; 325 TABLET ORAL at 10:12

## 2018-01-01 RX ADMIN — GABAPENTIN 800 MG: 400 CAPSULE ORAL at 08:07

## 2018-01-01 RX ADMIN — OXYCODONE AND ACETAMINOPHEN 1 TABLET: 10; 325 TABLET ORAL at 10:01

## 2018-01-01 RX ADMIN — MORPHINE SULFATE 1 MG: 2 INJECTION, SOLUTION INTRAMUSCULAR; INTRAVENOUS at 21:50

## 2018-01-01 RX ADMIN — GABAPENTIN 800 MG: 400 CAPSULE ORAL at 13:27

## 2018-01-01 RX ADMIN — AMOXICILLIN AND CLAVULANATE POTASSIUM 1 TABLET: 875; 125 TABLET, FILM COATED ORAL at 21:57

## 2018-01-01 RX ADMIN — MIDODRINE HYDROCHLORIDE 5 MG: 5 TABLET ORAL at 11:42

## 2018-01-01 RX ADMIN — OXYCODONE AND ACETAMINOPHEN 1 TABLET: 10; 325 TABLET ORAL at 08:08

## 2018-01-01 RX ADMIN — MIDODRINE HYDROCHLORIDE 5 MG: 5 TABLET ORAL at 09:23

## 2018-01-01 RX ADMIN — CEFDINIR 300 MG: 300 CAPSULE ORAL at 09:14

## 2018-01-01 RX ADMIN — MULTIPLE VITAMINS W/ MINERALS TAB 1 TABLET: TAB at 10:03

## 2018-01-01 RX ADMIN — FAMOTIDINE 20 MG: 20 TABLET ORAL at 09:12

## 2018-01-01 RX ADMIN — ONDANSETRON HYDROCHLORIDE 4 MG: 2 INJECTION, SOLUTION INTRAMUSCULAR; INTRAVENOUS at 13:29

## 2018-01-01 RX ADMIN — Medication 10 ML: at 11:43

## 2018-01-01 RX ADMIN — MORPHINE SULFATE 1 MG: 2 INJECTION, SOLUTION INTRAMUSCULAR; INTRAVENOUS at 08:02

## 2018-01-01 RX ADMIN — AMOXICILLIN AND CLAVULANATE POTASSIUM 1 TABLET: 875; 125 TABLET, FILM COATED ORAL at 09:54

## 2018-01-01 RX ADMIN — DIPHENHYDRAMINE HCL 25 MG: 25 TABLET ORAL at 00:27

## 2018-01-01 RX ADMIN — OXYCODONE AND ACETAMINOPHEN 1 TABLET: 10; 325 TABLET ORAL at 13:23

## 2018-01-01 RX ADMIN — GABAPENTIN 800 MG: 400 CAPSULE ORAL at 21:30

## 2018-01-01 RX ADMIN — FAMOTIDINE 20 MG: 20 TABLET ORAL at 09:10

## 2018-01-01 RX ADMIN — OXYCODONE AND ACETAMINOPHEN 1 TABLET: 10; 325 TABLET ORAL at 13:24

## 2018-01-01 RX ADMIN — FERROUS SULFATE TAB 325 MG (65 MG ELEMENTAL FE) 325 MG: 325 (65 FE) TAB at 16:49

## 2018-01-01 RX ADMIN — FAMOTIDINE 20 MG: 10 INJECTION, SOLUTION INTRAVENOUS at 18:13

## 2018-01-01 RX ADMIN — CEFDINIR 300 MG: 300 CAPSULE ORAL at 09:54

## 2018-01-01 RX ADMIN — PIPERACILLIN SODIUM AND TAZOBACTAM SODIUM 3.38 G: 3; .375 INJECTION, POWDER, LYOPHILIZED, FOR SOLUTION INTRAVENOUS at 13:04

## 2018-01-01 RX ADMIN — LEVETIRACETAM 1000 MG: 500 TABLET, FILM COATED ORAL at 20:50

## 2018-01-01 RX ADMIN — ONDANSETRON HYDROCHLORIDE 4 MG: 2 INJECTION, SOLUTION INTRAMUSCULAR; INTRAVENOUS at 19:03

## 2018-01-01 RX ADMIN — LEVETIRACETAM 1000 MG: 500 TABLET, FILM COATED ORAL at 09:55

## 2018-01-01 RX ADMIN — ALPRAZOLAM 1 MG: 1 TABLET ORAL at 15:40

## 2018-01-01 RX ADMIN — ONDANSETRON HYDROCHLORIDE 4 MG: 2 INJECTION, SOLUTION INTRAMUSCULAR; INTRAVENOUS at 20:30

## 2018-01-01 RX ADMIN — LEVETIRACETAM 1000 MG: 500 TABLET, FILM COATED ORAL at 21:57

## 2018-01-01 RX ADMIN — MULTIPLE VITAMINS W/ MINERALS TAB: TAB at 09:30

## 2018-01-01 RX ADMIN — FAMOTIDINE 20 MG: 20 TABLET ORAL at 09:30

## 2018-01-01 RX ADMIN — VENLAFAXINE 75 MG: 75 TABLET ORAL at 09:59

## 2018-01-01 RX ADMIN — GABAPENTIN 800 MG: 400 CAPSULE ORAL at 13:22

## 2018-01-01 RX ADMIN — VANCOMYCIN HYDROCHLORIDE 1000 MG: 1 INJECTION, POWDER, LYOPHILIZED, FOR SOLUTION INTRAVENOUS at 13:41

## 2018-01-01 RX ADMIN — MULTIPLE VITAMINS W/ MINERALS TAB 1 TABLET: TAB at 10:26

## 2018-01-01 RX ADMIN — MORPHINE SULFATE 0.5 MG: 2 INJECTION, SOLUTION INTRAMUSCULAR; INTRAVENOUS at 20:48

## 2018-01-01 RX ADMIN — ALPRAZOLAM 1 MG: 1 TABLET ORAL at 16:48

## 2018-01-01 RX ADMIN — AMOXICILLIN AND CLAVULANATE POTASSIUM 1 TABLET: 875; 125 TABLET, FILM COATED ORAL at 08:09

## 2018-01-01 RX ADMIN — OXYCODONE HYDROCHLORIDE AND ACETAMINOPHEN 1 TABLET: 5; 325 TABLET ORAL at 06:03

## 2018-01-01 RX ADMIN — Medication 10 ML: at 16:49

## 2018-01-01 RX ADMIN — OXYCODONE HYDROCHLORIDE AND ACETAMINOPHEN 1 TABLET: 5; 325 TABLET ORAL at 15:59

## 2018-01-01 RX ADMIN — FERROUS SULFATE TAB 325 MG (65 MG ELEMENTAL FE) 325 MG: 325 (65 FE) TAB at 09:07

## 2018-01-01 RX ADMIN — ALPRAZOLAM 1 MG: 1 TABLET ORAL at 22:19

## 2018-01-01 RX ADMIN — PIPERACILLIN SODIUM AND TAZOBACTAM SODIUM 3.38 G: 3; .375 INJECTION, POWDER, LYOPHILIZED, FOR SOLUTION INTRAVENOUS at 20:31

## 2018-01-01 RX ADMIN — SODIUM CHLORIDE: 9 INJECTION, SOLUTION INTRAVENOUS at 06:03

## 2018-01-01 RX ADMIN — MELATONIN 3 MG ORAL TABLET 3 MG: 3 TABLET ORAL at 20:50

## 2018-01-01 RX ADMIN — FOLIC ACID 1 MG: 1 TABLET ORAL at 09:55

## 2018-01-01 RX ADMIN — GABAPENTIN 800 MG: 400 CAPSULE ORAL at 22:18

## 2018-01-01 RX ADMIN — SODIUM CHLORIDE: 9 INJECTION, SOLUTION INTRAVENOUS at 22:17

## 2018-01-01 RX ADMIN — AMOXICILLIN AND CLAVULANATE POTASSIUM 1 TABLET: 875; 125 TABLET, FILM COATED ORAL at 22:13

## 2018-01-01 RX ADMIN — VENLAFAXINE 75 MG: 75 TABLET ORAL at 22:17

## 2018-01-01 RX ADMIN — Medication 10 ML: at 21:07

## 2018-01-01 RX ADMIN — OXYCODONE AND ACETAMINOPHEN 1 TABLET: 10; 325 TABLET ORAL at 22:16

## 2018-01-01 RX ADMIN — LEVETIRACETAM 1000 MG: 500 TABLET, FILM COATED ORAL at 09:04

## 2018-01-01 RX ADMIN — OXYCODONE AND ACETAMINOPHEN 1 TABLET: 10; 325 TABLET ORAL at 19:41

## 2018-01-01 RX ADMIN — GABAPENTIN 800 MG: 400 CAPSULE ORAL at 09:12

## 2018-01-01 RX ADMIN — VENLAFAXINE 75 MG: 75 TABLET ORAL at 09:12

## 2018-01-01 RX ADMIN — SODIUM CHLORIDE: 9 INJECTION, SOLUTION INTRAVENOUS at 07:43

## 2018-01-01 RX ADMIN — PIPERACILLIN SODIUM AND TAZOBACTAM SODIUM 3.38 G: 3; .375 INJECTION, POWDER, LYOPHILIZED, FOR SOLUTION INTRAVENOUS at 15:06

## 2018-01-01 RX ADMIN — GABAPENTIN 800 MG: 400 CAPSULE ORAL at 15:06

## 2018-01-01 RX ADMIN — OXYCODONE AND ACETAMINOPHEN 1 TABLET: 10; 325 TABLET ORAL at 11:59

## 2018-01-01 RX ADMIN — Medication 10 ML: at 09:11

## 2018-01-01 RX ADMIN — LEVETIRACETAM 1000 MG: 500 TABLET, FILM COATED ORAL at 09:06

## 2018-01-01 RX ADMIN — OXYCODONE AND ACETAMINOPHEN 1 TABLET: 10; 325 TABLET ORAL at 20:00

## 2018-01-01 RX ADMIN — FAMOTIDINE 20 MG: 20 TABLET ORAL at 09:14

## 2018-01-01 RX ADMIN — OXYCODONE AND ACETAMINOPHEN 1 TABLET: 10; 325 TABLET ORAL at 05:26

## 2018-01-01 RX ADMIN — MELATONIN 3 MG ORAL TABLET 3 MG: 3 TABLET ORAL at 20:00

## 2018-01-01 RX ADMIN — LEVETIRACETAM 1000 MG: 500 TABLET, FILM COATED ORAL at 21:30

## 2018-01-01 RX ADMIN — ALPRAZOLAM 1 MG: 1 TABLET ORAL at 15:04

## 2018-01-01 RX ADMIN — FAMOTIDINE 20 MG: 20 TABLET ORAL at 08:44

## 2018-01-01 RX ADMIN — FAMOTIDINE 20 MG: 20 TABLET ORAL at 11:42

## 2018-01-01 RX ADMIN — ALPRAZOLAM 1 MG: 1 TABLET ORAL at 23:28

## 2018-01-01 RX ADMIN — LEVETIRACETAM 1000 MG: 500 TABLET, FILM COATED ORAL at 08:50

## 2018-01-01 RX ADMIN — MULTIPLE VITAMINS W/ MINERALS TAB 1 TABLET: TAB at 09:15

## 2018-01-01 RX ADMIN — OXYCODONE HYDROCHLORIDE AND ACETAMINOPHEN 1 TABLET: 5; 325 TABLET ORAL at 19:18

## 2018-01-01 RX ADMIN — HYDROXYZINE PAMOATE 50 MG: 25 CAPSULE ORAL at 22:00

## 2018-01-01 RX ADMIN — OXYCODONE AND ACETAMINOPHEN 1 TABLET: 10; 325 TABLET ORAL at 02:23

## 2018-01-01 RX ADMIN — PETROLATUM: 42 OINTMENT TOPICAL at 09:16

## 2018-01-01 RX ADMIN — SODIUM CHLORIDE: 9 INJECTION, SOLUTION INTRAVENOUS at 00:31

## 2018-01-01 RX ADMIN — LEVETIRACETAM 1000 MG: 500 TABLET, FILM COATED ORAL at 08:55

## 2018-01-01 RX ADMIN — FERROUS SULFATE TAB 325 MG (65 MG ELEMENTAL FE) 325 MG: 325 (65 FE) TAB at 08:09

## 2018-01-01 RX ADMIN — LIDOCAINE HYDROCHLORIDE 6 ML: 20 INJECTION, SOLUTION INFILTRATION; PERINEURAL at 11:30

## 2018-01-01 RX ADMIN — MULTIPLE VITAMINS W/ MINERALS TAB 1 TABLET: TAB at 09:12

## 2018-01-01 RX ADMIN — BACLOFEN 10 MG: 10 TABLET ORAL at 16:15

## 2018-01-01 RX ADMIN — OXYCODONE AND ACETAMINOPHEN 1 TABLET: 10; 325 TABLET ORAL at 11:51

## 2018-01-01 RX ADMIN — FOLIC ACID 1 MG: 1 TABLET ORAL at 10:02

## 2018-01-01 RX ADMIN — FAMOTIDINE 20 MG: 20 TABLET ORAL at 21:57

## 2018-01-01 RX ADMIN — OXYCODONE AND ACETAMINOPHEN 1 TABLET: 10; 325 TABLET ORAL at 14:55

## 2018-01-01 RX ADMIN — AMOXICILLIN AND CLAVULANATE POTASSIUM 1 TABLET: 875; 125 TABLET, FILM COATED ORAL at 08:15

## 2018-01-01 RX ADMIN — SODIUM CHLORIDE 500 ML: 9 INJECTION, SOLUTION INTRAVENOUS at 06:19

## 2018-01-01 RX ADMIN — FAMOTIDINE 20 MG: 20 TABLET ORAL at 09:59

## 2018-01-01 RX ADMIN — GABAPENTIN 800 MG: 400 CAPSULE ORAL at 15:15

## 2018-01-01 RX ADMIN — GABAPENTIN 800 MG: 400 CAPSULE ORAL at 13:12

## 2018-01-01 RX ADMIN — FAMOTIDINE 20 MG: 20 TABLET ORAL at 21:29

## 2018-01-01 RX ADMIN — MELATONIN 3 MG ORAL TABLET 3 MG: 3 TABLET ORAL at 22:00

## 2018-01-01 RX ADMIN — DIPHENHYDRAMINE HYDROCHLORIDE 25 MG: 50 INJECTION, SOLUTION INTRAMUSCULAR; INTRAVENOUS at 18:00

## 2018-01-01 RX ADMIN — GABAPENTIN 800 MG: 400 CAPSULE ORAL at 11:44

## 2018-01-01 RX ADMIN — CEFDINIR 300 MG: 300 CAPSULE ORAL at 08:15

## 2018-01-01 RX ADMIN — MORPHINE SULFATE 1 MG: 2 INJECTION, SOLUTION INTRAMUSCULAR; INTRAVENOUS at 15:56

## 2018-01-01 RX ADMIN — VENLAFAXINE 75 MG: 75 TABLET ORAL at 08:50

## 2018-01-01 RX ADMIN — FERROUS SULFATE TAB 325 MG (65 MG ELEMENTAL FE) 325 MG: 325 (65 FE) TAB at 09:14

## 2018-01-01 RX ADMIN — ALPRAZOLAM 1 MG: 1 TABLET ORAL at 01:38

## 2018-01-01 RX ADMIN — MELATONIN 3 MG ORAL TABLET 3 MG: 3 TABLET ORAL at 23:35

## 2018-01-01 RX ADMIN — Medication 10 ML: at 08:38

## 2018-01-01 RX ADMIN — OXYCODONE AND ACETAMINOPHEN 1 TABLET: 10; 325 TABLET ORAL at 03:41

## 2018-01-01 RX ADMIN — LEVETIRACETAM 1000 MG: 500 TABLET, FILM COATED ORAL at 21:19

## 2018-01-01 RX ADMIN — LEVETIRACETAM 1000 MG: 500 TABLET, FILM COATED ORAL at 09:30

## 2018-01-01 RX ADMIN — GABAPENTIN 800 MG: 400 CAPSULE ORAL at 09:05

## 2018-01-01 RX ADMIN — FAMOTIDINE 20 MG: 20 TABLET ORAL at 09:55

## 2018-01-01 RX ADMIN — GABAPENTIN 800 MG: 400 CAPSULE ORAL at 22:01

## 2018-01-01 RX ADMIN — OXYCODONE HYDROCHLORIDE AND ACETAMINOPHEN 1 TABLET: 5; 325 TABLET ORAL at 01:10

## 2018-01-01 RX ADMIN — MULTIPLE VITAMINS W/ MINERALS TAB 1 TABLET: TAB at 09:11

## 2018-01-01 RX ADMIN — DIPHENHYDRAMINE HCL 25 MG: 25 TABLET ORAL at 00:34

## 2018-01-01 RX ADMIN — OXYCODONE AND ACETAMINOPHEN 1 TABLET: 10; 325 TABLET ORAL at 04:06

## 2018-01-01 RX ADMIN — LEVETIRACETAM 1000 MG: 500 TABLET, FILM COATED ORAL at 20:54

## 2018-01-01 RX ADMIN — VANCOMYCIN HYDROCHLORIDE 1000 MG: 1 INJECTION, POWDER, LYOPHILIZED, FOR SOLUTION INTRAVENOUS at 00:49

## 2018-01-01 RX ADMIN — VENLAFAXINE 75 MG: 75 TABLET ORAL at 20:30

## 2018-01-01 RX ADMIN — FAMOTIDINE 20 MG: 20 TABLET ORAL at 22:13

## 2018-01-01 RX ADMIN — SODIUM CHLORIDE: 9 INJECTION, SOLUTION INTRAVENOUS at 00:40

## 2018-01-01 RX ADMIN — FERROUS SULFATE TAB 325 MG (65 MG ELEMENTAL FE) 325 MG: 325 (65 FE) TAB at 09:05

## 2018-01-01 RX ADMIN — OXYCODONE AND ACETAMINOPHEN 1 TABLET: 10; 325 TABLET ORAL at 16:14

## 2018-01-01 RX ADMIN — FERROUS SULFATE TAB 325 MG (65 MG ELEMENTAL FE) 325 MG: 325 (65 FE) TAB at 17:36

## 2018-01-01 RX ADMIN — GABAPENTIN 800 MG: 400 CAPSULE ORAL at 09:07

## 2018-01-01 RX ADMIN — CEFDINIR 300 MG: 300 CAPSULE ORAL at 21:39

## 2018-01-01 RX ADMIN — NALXONE HYDROCHLORIDE 0.4 MG: 0.4 INJECTION INTRAMUSCULAR; INTRAVENOUS; SUBCUTANEOUS at 03:06

## 2018-01-01 RX ADMIN — ONDANSETRON HYDROCHLORIDE 4 MG: 4 TABLET, FILM COATED ORAL at 06:37

## 2018-01-01 RX ADMIN — LEVETIRACETAM 1000 MG: 500 TABLET, FILM COATED ORAL at 08:09

## 2018-01-01 RX ADMIN — ONDANSETRON HYDROCHLORIDE 4 MG: 2 INJECTION, SOLUTION INTRAMUSCULAR; INTRAVENOUS at 20:04

## 2018-01-01 RX ADMIN — Medication 10 ML: at 09:02

## 2018-01-01 RX ADMIN — PIPERACILLIN SODIUM AND TAZOBACTAM SODIUM 3.38 G: 3; .375 INJECTION, POWDER, LYOPHILIZED, FOR SOLUTION INTRAVENOUS at 13:23

## 2018-01-01 RX ADMIN — METHYLPREDNISOLONE SODIUM SUCCINATE 125 MG: 125 INJECTION, POWDER, FOR SOLUTION INTRAMUSCULAR; INTRAVENOUS at 18:13

## 2018-01-01 RX ADMIN — ALPRAZOLAM 1 MG: 1 TABLET ORAL at 12:49

## 2018-01-01 RX ADMIN — OXYCODONE HYDROCHLORIDE AND ACETAMINOPHEN 1 TABLET: 5; 325 TABLET ORAL at 09:08

## 2018-01-01 RX ADMIN — FAMOTIDINE 20 MG: 20 TABLET ORAL at 20:26

## 2018-01-01 RX ADMIN — AMOXICILLIN AND CLAVULANATE POTASSIUM 1 TABLET: 875; 125 TABLET, FILM COATED ORAL at 21:26

## 2018-01-01 RX ADMIN — ALPRAZOLAM 1 MG: 1 TABLET ORAL at 22:10

## 2018-01-01 RX ADMIN — VENLAFAXINE 75 MG: 75 TABLET ORAL at 08:09

## 2018-01-01 RX ADMIN — MORPHINE SULFATE 1 MG: 2 INJECTION, SOLUTION INTRAMUSCULAR; INTRAVENOUS at 09:05

## 2018-01-01 RX ADMIN — FERROUS SULFATE TAB 325 MG (65 MG ELEMENTAL FE) 325 MG: 325 (65 FE) TAB at 09:59

## 2018-01-01 RX ADMIN — FAMOTIDINE 20 MG: 20 TABLET ORAL at 21:20

## 2018-01-01 RX ADMIN — VENLAFAXINE 75 MG: 75 TABLET ORAL at 19:42

## 2018-01-01 RX ADMIN — CEFDINIR 300 MG: 300 CAPSULE ORAL at 21:26

## 2018-01-01 RX ADMIN — DIPHENHYDRAMINE HCL 25 MG: 25 TABLET ORAL at 20:50

## 2018-01-01 RX ADMIN — VENLAFAXINE 75 MG: 75 TABLET ORAL at 20:54

## 2018-01-01 RX ADMIN — FERROUS SULFATE TAB 325 MG (65 MG ELEMENTAL FE) 325 MG: 325 (65 FE) TAB at 18:24

## 2018-01-01 RX ADMIN — MULTIPLE VITAMINS W/ MINERALS TAB 1 TABLET: TAB at 11:51

## 2018-01-01 RX ADMIN — MORPHINE SULFATE 1 MG: 2 INJECTION, SOLUTION INTRAMUSCULAR; INTRAVENOUS at 16:59

## 2018-01-01 RX ADMIN — ONDANSETRON HYDROCHLORIDE 4 MG: 2 INJECTION, SOLUTION INTRAMUSCULAR; INTRAVENOUS at 18:58

## 2018-01-01 RX ADMIN — FAMOTIDINE 20 MG: 20 TABLET ORAL at 20:54

## 2018-01-01 RX ADMIN — FERROUS SULFATE TAB 325 MG (65 MG ELEMENTAL FE) 325 MG: 325 (65 FE) TAB at 17:24

## 2018-01-01 RX ADMIN — ALPRAZOLAM 1 MG: 1 TABLET ORAL at 09:28

## 2018-01-01 RX ADMIN — GABAPENTIN 800 MG: 400 CAPSULE ORAL at 15:59

## 2018-01-01 RX ADMIN — Medication 10 ML: at 14:53

## 2018-01-01 RX ADMIN — GABAPENTIN 800 MG: 400 CAPSULE ORAL at 15:04

## 2018-01-01 RX ADMIN — Medication 10 ML: at 10:44

## 2018-01-01 RX ADMIN — FOLIC ACID 1 MG: 1 TABLET ORAL at 09:15

## 2018-01-01 RX ADMIN — PETROLATUM: 42 OINTMENT TOPICAL at 08:50

## 2018-01-01 RX ADMIN — GABAPENTIN 800 MG: 400 CAPSULE ORAL at 22:04

## 2018-01-01 RX ADMIN — OXYCODONE AND ACETAMINOPHEN 1 TABLET: 10; 325 TABLET ORAL at 21:20

## 2018-01-01 RX ADMIN — PIPERACILLIN SODIUM AND TAZOBACTAM SODIUM 3.38 G: 3; .375 INJECTION, POWDER, LYOPHILIZED, FOR SOLUTION INTRAVENOUS at 05:40

## 2018-01-01 RX ADMIN — LEVETIRACETAM 1000 MG: 500 TABLET, FILM COATED ORAL at 22:01

## 2018-01-01 RX ADMIN — GABAPENTIN 800 MG: 400 CAPSULE ORAL at 15:22

## 2018-01-01 RX ADMIN — VENLAFAXINE 75 MG: 75 TABLET ORAL at 21:38

## 2018-01-01 RX ADMIN — FAMOTIDINE 20 MG: 20 TABLET ORAL at 09:05

## 2018-01-01 RX ADMIN — OXYCODONE AND ACETAMINOPHEN 1 TABLET: 10; 325 TABLET ORAL at 21:52

## 2018-01-01 RX ADMIN — MELATONIN 3 MG ORAL TABLET 3 MG: 3 TABLET ORAL at 21:30

## 2018-01-01 RX ADMIN — MELATONIN 3 MG ORAL TABLET 3 MG: 3 TABLET ORAL at 20:30

## 2018-01-01 RX ADMIN — PETROLATUM: 42 OINTMENT TOPICAL at 21:29

## 2018-01-01 RX ADMIN — VENLAFAXINE 75 MG: 75 TABLET ORAL at 23:35

## 2018-01-01 RX ADMIN — MULTIPLE VITAMINS W/ MINERALS TAB 1 TABLET: TAB at 08:44

## 2018-01-01 RX ADMIN — GABAPENTIN 800 MG: 400 CAPSULE ORAL at 13:41

## 2018-01-01 RX ADMIN — VENLAFAXINE 75 MG: 75 TABLET ORAL at 09:10

## 2018-01-01 RX ADMIN — METOPROLOL SUCCINATE 12.5 MG: 25 TABLET, EXTENDED RELEASE ORAL at 09:14

## 2018-01-01 RX ADMIN — GABAPENTIN 800 MG: 400 CAPSULE ORAL at 22:31

## 2018-01-01 RX ADMIN — OXYCODONE AND ACETAMINOPHEN 1 TABLET: 10; 325 TABLET ORAL at 14:53

## 2018-01-01 RX ADMIN — LEVETIRACETAM 1000 MG: 500 TABLET, FILM COATED ORAL at 22:18

## 2018-01-01 RX ADMIN — ALPRAZOLAM 1 MG: 1 TABLET ORAL at 00:27

## 2018-01-01 RX ADMIN — OXYCODONE AND ACETAMINOPHEN 1 TABLET: 10; 325 TABLET ORAL at 02:24

## 2018-01-01 RX ADMIN — FOLIC ACID 1 MG: 1 TABLET ORAL at 09:16

## 2018-01-01 RX ADMIN — VENLAFAXINE 75 MG: 75 TABLET ORAL at 09:11

## 2018-01-01 RX ADMIN — VENLAFAXINE 75 MG: 75 TABLET ORAL at 21:22

## 2018-01-01 RX ADMIN — FLUDROCORTISONE ACETATE 0.1 MG: 0.1 TABLET ORAL at 01:18

## 2018-01-01 RX ADMIN — FERROUS SULFATE TAB 325 MG (65 MG ELEMENTAL FE) 325 MG: 325 (65 FE) TAB at 11:42

## 2018-01-01 RX ADMIN — ALPRAZOLAM 1 MG: 1 TABLET ORAL at 14:18

## 2018-01-01 RX ADMIN — LEVETIRACETAM 1000 MG: 500 TABLET, FILM COATED ORAL at 11:43

## 2018-01-01 RX ADMIN — FERROUS SULFATE TAB 325 MG (65 MG ELEMENTAL FE) 325 MG: 325 (65 FE) TAB at 09:56

## 2018-01-01 RX ADMIN — OXYCODONE AND ACETAMINOPHEN 1 TABLET: 10; 325 TABLET ORAL at 21:30

## 2018-01-01 RX ADMIN — FERROUS SULFATE TAB 325 MG (65 MG ELEMENTAL FE) 325 MG: 325 (65 FE) TAB at 09:29

## 2018-01-01 RX ADMIN — MELATONIN 3 MG ORAL TABLET 3 MG: 3 TABLET ORAL at 23:28

## 2018-01-01 RX ADMIN — CEFEPIME HYDROCHLORIDE 2 G: 2 INJECTION, POWDER, FOR SOLUTION INTRAVENOUS at 17:42

## 2018-01-01 RX ADMIN — OXYCODONE AND ACETAMINOPHEN 1 TABLET: 10; 325 TABLET ORAL at 07:52

## 2018-01-01 RX ADMIN — GABAPENTIN 800 MG: 400 CAPSULE ORAL at 09:32

## 2018-01-01 RX ADMIN — VENLAFAXINE 75 MG: 75 TABLET ORAL at 22:04

## 2018-01-01 RX ADMIN — GABAPENTIN 800 MG: 400 CAPSULE ORAL at 08:50

## 2018-01-01 RX ADMIN — FERROUS SULFATE TAB 325 MG (65 MG ELEMENTAL FE) 325 MG: 325 (65 FE) TAB at 17:11

## 2018-01-01 RX ADMIN — FAMOTIDINE 20 MG: 20 TABLET ORAL at 08:51

## 2018-01-01 RX ADMIN — MORPHINE SULFATE 1 MG: 2 INJECTION, SOLUTION INTRAMUSCULAR; INTRAVENOUS at 21:22

## 2018-01-01 RX ADMIN — FOLIC ACID 1 MG: 1 TABLET ORAL at 09:23

## 2018-01-01 RX ADMIN — Medication 10 ML: at 13:10

## 2018-01-01 RX ADMIN — SODIUM CHLORIDE 500 ML: 9 INJECTION, SOLUTION INTRAVENOUS at 16:16

## 2018-01-01 RX ADMIN — VENLAFAXINE 75 MG: 75 TABLET ORAL at 09:16

## 2018-01-01 RX ADMIN — FERROUS SULFATE TAB 325 MG (65 MG ELEMENTAL FE) 325 MG: 325 (65 FE) TAB at 17:47

## 2018-01-01 RX ADMIN — FAMOTIDINE 20 MG: 20 TABLET ORAL at 21:22

## 2018-01-01 RX ADMIN — METOPROLOL SUCCINATE 12.5 MG: 25 TABLET, EXTENDED RELEASE ORAL at 22:16

## 2018-01-01 RX ADMIN — ALPRAZOLAM 1 MG: 1 TABLET ORAL at 09:07

## 2018-01-01 RX ADMIN — ONDANSETRON HYDROCHLORIDE 4 MG: 4 TABLET, FILM COATED ORAL at 21:25

## 2018-01-01 RX ADMIN — CEFDINIR 300 MG: 250 POWDER, FOR SUSPENSION ORAL at 18:55

## 2018-01-01 RX ADMIN — PIPERACILLIN SODIUM AND TAZOBACTAM SODIUM 3.38 G: 3; .375 INJECTION, POWDER, LYOPHILIZED, FOR SOLUTION INTRAVENOUS at 21:23

## 2018-01-01 RX ADMIN — LEVETIRACETAM 1000 MG: 500 TABLET, FILM COATED ORAL at 20:30

## 2018-01-01 RX ADMIN — OXYCODONE HYDROCHLORIDE AND ACETAMINOPHEN 1 TABLET: 5; 325 TABLET ORAL at 20:30

## 2018-01-01 RX ADMIN — METOPROLOL SUCCINATE 12.5 MG: 25 TABLET, EXTENDED RELEASE ORAL at 09:16

## 2018-01-01 RX ADMIN — OXYCODONE HYDROCHLORIDE AND ACETAMINOPHEN 1 TABLET: 5; 325 TABLET ORAL at 15:15

## 2018-01-01 RX ADMIN — Medication 10 ML: at 10:08

## 2018-01-01 RX ADMIN — SODIUM CHLORIDE 500 ML: 9 INJECTION, SOLUTION INTRAVENOUS at 05:45

## 2018-01-01 RX ADMIN — FOLIC ACID 1 MG: 1 TABLET ORAL at 09:10

## 2018-01-01 RX ADMIN — FAMOTIDINE 20 MG: 20 TABLET ORAL at 22:01

## 2018-01-01 RX ADMIN — SODIUM CHLORIDE 500 ML: 9 INJECTION, SOLUTION INTRAVENOUS at 03:25

## 2018-01-01 RX ADMIN — MULTIPLE VITAMINS W/ MINERALS TAB 1 TABLET: TAB at 09:06

## 2018-01-01 RX ADMIN — FERROUS SULFATE TAB 325 MG (65 MG ELEMENTAL FE) 325 MG: 325 (65 FE) TAB at 09:15

## 2018-01-01 RX ADMIN — CEFDINIR 300 MG: 300 CAPSULE ORAL at 19:42

## 2018-01-01 RX ADMIN — SODIUM CHLORIDE: 9 INJECTION, SOLUTION INTRAVENOUS at 13:13

## 2018-01-01 RX ADMIN — METOPROLOL SUCCINATE 12.5 MG: 25 TABLET, EXTENDED RELEASE ORAL at 09:10

## 2018-01-01 RX ADMIN — MIDODRINE HYDROCHLORIDE 5 MG: 5 TABLET ORAL at 16:15

## 2018-01-01 RX ADMIN — VENLAFAXINE 75 MG: 75 TABLET ORAL at 21:24

## 2018-01-01 RX ADMIN — METOPROLOL SUCCINATE 12.5 MG: 25 TABLET, EXTENDED RELEASE ORAL at 14:06

## 2018-01-01 RX ADMIN — OXYCODONE AND ACETAMINOPHEN 1 TABLET: 10; 325 TABLET ORAL at 13:12

## 2018-01-01 RX ADMIN — MELATONIN 3 MG ORAL TABLET 3 MG: 3 TABLET ORAL at 22:05

## 2018-01-01 RX ADMIN — VENLAFAXINE 75 MG: 75 TABLET ORAL at 09:55

## 2018-01-01 RX ADMIN — GABAPENTIN 800 MG: 400 CAPSULE ORAL at 14:05

## 2018-01-01 RX ADMIN — FAMOTIDINE 20 MG: 20 TABLET ORAL at 20:50

## 2018-01-01 RX ADMIN — MORPHINE SULFATE 1 MG: 2 INJECTION, SOLUTION INTRAMUSCULAR; INTRAVENOUS at 13:01

## 2018-01-01 RX ADMIN — OXYCODONE HYDROCHLORIDE AND ACETAMINOPHEN 1 TABLET: 5; 325 TABLET ORAL at 23:22

## 2018-01-01 RX ADMIN — FOLIC ACID 1 MG: 1 TABLET ORAL at 08:09

## 2018-01-01 RX ADMIN — Medication 20 ML: at 09:09

## 2018-01-01 RX ADMIN — MELATONIN 3 MG ORAL TABLET 3 MG: 3 TABLET ORAL at 22:31

## 2018-01-01 RX ADMIN — GABAPENTIN 800 MG: 400 CAPSULE ORAL at 21:38

## 2018-01-01 RX ADMIN — LEVETIRACETAM 1000 MG: 500 TABLET, FILM COATED ORAL at 19:41

## 2018-01-01 RX ADMIN — GABAPENTIN 800 MG: 400 CAPSULE ORAL at 08:55

## 2018-01-01 RX ADMIN — ACETAMINOPHEN 650 MG: 325 TABLET ORAL at 15:05

## 2018-01-01 RX ADMIN — GABAPENTIN 800 MG: 400 CAPSULE ORAL at 20:30

## 2018-01-01 RX ADMIN — VENLAFAXINE 75 MG: 75 TABLET ORAL at 21:29

## 2018-01-01 RX ADMIN — FOLIC ACID 1 MG: 1 TABLET ORAL at 09:07

## 2018-01-01 RX ADMIN — ONDANSETRON HYDROCHLORIDE 4 MG: 2 INJECTION, SOLUTION INTRAMUSCULAR; INTRAVENOUS at 22:19

## 2018-01-01 RX ADMIN — FAMOTIDINE 20 MG: 20 TABLET ORAL at 09:23

## 2018-01-01 RX ADMIN — MIDODRINE HYDROCHLORIDE 5 MG: 5 TABLET ORAL at 06:24

## 2018-01-01 RX ADMIN — OXYCODONE HYDROCHLORIDE AND ACETAMINOPHEN 1 TABLET: 5; 325 TABLET ORAL at 14:38

## 2018-01-01 RX ADMIN — GABAPENTIN 800 MG: 400 CAPSULE ORAL at 21:57

## 2018-01-01 RX ADMIN — FAMOTIDINE 20 MG: 20 TABLET ORAL at 09:15

## 2018-01-01 RX ADMIN — LEVETIRACETAM 1000 MG: 500 TABLET, FILM COATED ORAL at 21:38

## 2018-01-01 RX ADMIN — ALPRAZOLAM 1 MG: 1 TABLET ORAL at 22:28

## 2018-01-01 RX ADMIN — FAMOTIDINE 20 MG: 20 TABLET ORAL at 14:06

## 2018-01-01 RX ADMIN — GABAPENTIN 800 MG: 400 CAPSULE ORAL at 14:08

## 2018-01-01 RX ADMIN — DIPHENHYDRAMINE HCL 25 MG: 25 TABLET ORAL at 01:45

## 2018-01-01 RX ADMIN — MIDODRINE HYDROCHLORIDE 5 MG: 5 TABLET ORAL at 10:03

## 2018-01-01 RX ADMIN — LEVETIRACETAM 1000 MG: 500 TABLET, FILM COATED ORAL at 22:32

## 2018-01-01 RX ADMIN — MELATONIN 3 MG ORAL TABLET 3 MG: 3 TABLET ORAL at 20:54

## 2018-01-01 RX ADMIN — MULTIPLE VITAMINS W/ MINERALS TAB 1 TABLET: TAB at 09:23

## 2018-01-01 RX ADMIN — OXYCODONE AND ACETAMINOPHEN 1 TABLET: 10; 325 TABLET ORAL at 23:53

## 2018-01-01 RX ADMIN — GABAPENTIN 800 MG: 400 CAPSULE ORAL at 21:24

## 2018-01-01 RX ADMIN — GABAPENTIN 800 MG: 400 CAPSULE ORAL at 09:29

## 2018-01-01 RX ADMIN — ONDANSETRON HYDROCHLORIDE 4 MG: 2 INJECTION, SOLUTION INTRAMUSCULAR; INTRAVENOUS at 14:05

## 2018-01-01 RX ADMIN — MELATONIN 3 MG ORAL TABLET 3 MG: 3 TABLET ORAL at 20:26

## 2018-01-01 RX ADMIN — FOLIC ACID 1 MG: 1 TABLET ORAL at 09:12

## 2018-01-01 RX ADMIN — OXYCODONE AND ACETAMINOPHEN 1 TABLET: 10; 325 TABLET ORAL at 11:22

## 2018-01-01 RX ADMIN — OXYCODONE AND ACETAMINOPHEN 1 TABLET: 10; 325 TABLET ORAL at 22:00

## 2018-01-01 RX ADMIN — ALPRAZOLAM 1 MG: 1 TABLET ORAL at 13:48

## 2018-01-01 RX ADMIN — OXYCODONE AND ACETAMINOPHEN 1 TABLET: 10; 325 TABLET ORAL at 09:14

## 2018-01-01 RX ADMIN — CEFDINIR 300 MG: 300 CAPSULE ORAL at 09:30

## 2018-01-01 RX ADMIN — POTASSIUM CHLORIDE 10 MEQ: 10 INJECTION, SOLUTION INTRAVENOUS at 16:15

## 2018-01-01 RX ADMIN — LEVETIRACETAM 1000 MG: 500 TABLET, FILM COATED ORAL at 21:25

## 2018-01-01 RX ADMIN — VENLAFAXINE 75 MG: 75 TABLET ORAL at 08:56

## 2018-01-01 RX ADMIN — VENLAFAXINE 75 MG: 75 TABLET ORAL at 20:26

## 2018-01-01 RX ADMIN — CEFDINIR 300 MG: 300 CAPSULE ORAL at 21:57

## 2018-01-01 RX ADMIN — ONDANSETRON HYDROCHLORIDE 4 MG: 2 INJECTION, SOLUTION INTRAMUSCULAR; INTRAVENOUS at 00:34

## 2018-01-01 RX ADMIN — FAMOTIDINE 20 MG: 20 TABLET ORAL at 08:55

## 2018-01-01 RX ADMIN — VENLAFAXINE 75 MG: 75 TABLET ORAL at 09:04

## 2018-01-01 RX ADMIN — VENLAFAXINE 75 MG: 75 TABLET ORAL at 20:50

## 2018-01-01 RX ADMIN — LEVETIRACETAM 1000 MG: 500 TABLET, FILM COATED ORAL at 09:29

## 2018-01-01 RX ADMIN — OXYCODONE AND ACETAMINOPHEN 1 TABLET: 10; 325 TABLET ORAL at 08:14

## 2018-01-01 RX ADMIN — LEVETIRACETAM 1000 MG: 500 TABLET, FILM COATED ORAL at 09:16

## 2018-01-01 RX ADMIN — VENLAFAXINE 75 MG: 75 TABLET ORAL at 22:32

## 2018-01-01 RX ADMIN — MELATONIN 3 MG ORAL TABLET 3 MG: 3 TABLET ORAL at 22:18

## 2018-01-01 RX ADMIN — VENLAFAXINE 75 MG: 75 TABLET ORAL at 22:13

## 2018-01-01 RX ADMIN — VENLAFAXINE 75 MG: 75 TABLET ORAL at 22:06

## 2018-01-01 RX ADMIN — OXYCODONE AND ACETAMINOPHEN 1 TABLET: 10; 325 TABLET ORAL at 14:05

## 2018-01-01 RX ADMIN — VENLAFAXINE 75 MG: 75 TABLET ORAL at 11:06

## 2018-01-01 RX ADMIN — GABAPENTIN 800 MG: 400 CAPSULE ORAL at 19:41

## 2018-01-01 RX ADMIN — MORPHINE SULFATE 1 MG: 2 INJECTION, SOLUTION INTRAMUSCULAR; INTRAVENOUS at 11:22

## 2018-01-01 RX ADMIN — LEVETIRACETAM 1000 MG: 500 TABLET, FILM COATED ORAL at 21:22

## 2018-01-01 RX ADMIN — ONDANSETRON HYDROCHLORIDE 4 MG: 2 INJECTION, SOLUTION INTRAMUSCULAR; INTRAVENOUS at 12:49

## 2018-01-01 RX ADMIN — ALPRAZOLAM 1 MG: 1 TABLET ORAL at 00:10

## 2018-01-01 RX ADMIN — MORPHINE SULFATE 1 MG: 2 INJECTION, SOLUTION INTRAMUSCULAR; INTRAVENOUS at 13:05

## 2018-01-01 RX ADMIN — FERROUS SULFATE TAB 325 MG (65 MG ELEMENTAL FE) 325 MG: 325 (65 FE) TAB at 17:44

## 2018-01-01 RX ADMIN — Medication 10 ML: at 21:22

## 2018-01-01 RX ADMIN — POTASSIUM CHLORIDE, DEXTROSE MONOHYDRATE AND SODIUM CHLORIDE: 150; 5; 450 INJECTION, SOLUTION INTRAVENOUS at 17:12

## 2018-01-01 RX ADMIN — Medication 10 ML: at 21:23

## 2018-01-01 RX ADMIN — FAMOTIDINE 20 MG: 20 TABLET ORAL at 21:24

## 2018-01-01 RX ADMIN — POTASSIUM CHLORIDE, DEXTROSE MONOHYDRATE AND SODIUM CHLORIDE: 150; 5; 450 INJECTION, SOLUTION INTRAVENOUS at 01:57

## 2018-01-01 RX ADMIN — OXYCODONE AND ACETAMINOPHEN 1 TABLET: 10; 325 TABLET ORAL at 23:35

## 2018-01-01 RX ADMIN — AMOXICILLIN AND CLAVULANATE POTASSIUM 1 TABLET: 875; 125 TABLET, FILM COATED ORAL at 19:42

## 2018-01-01 RX ADMIN — MORPHINE SULFATE 0.5 MG: 2 INJECTION, SOLUTION INTRAMUSCULAR; INTRAVENOUS at 05:52

## 2018-01-01 RX ADMIN — FERROUS SULFATE TAB 325 MG (65 MG ELEMENTAL FE) 325 MG: 325 (65 FE) TAB at 17:49

## 2018-01-01 RX ADMIN — MIDODRINE HYDROCHLORIDE 5 MG: 5 TABLET ORAL at 00:05

## 2018-01-01 RX ADMIN — OXYCODONE AND ACETAMINOPHEN 1 TABLET: 10; 325 TABLET ORAL at 09:15

## 2018-01-01 RX ADMIN — MORPHINE SULFATE 0.5 MG: 2 INJECTION, SOLUTION INTRAMUSCULAR; INTRAVENOUS at 00:52

## 2018-01-01 RX ADMIN — FAMOTIDINE 20 MG: 20 TABLET ORAL at 22:05

## 2018-01-01 RX ADMIN — AMOXICILLIN AND CLAVULANATE POTASSIUM 1 TABLET: 875; 125 TABLET, FILM COATED ORAL at 09:30

## 2018-01-01 RX ADMIN — MIDODRINE HYDROCHLORIDE 5 MG: 5 TABLET ORAL at 08:51

## 2018-01-01 RX ADMIN — OXYCODONE HYDROCHLORIDE AND ACETAMINOPHEN 1 TABLET: 5; 325 TABLET ORAL at 19:06

## 2018-01-01 RX ADMIN — FAMOTIDINE 20 MG: 20 TABLET ORAL at 21:38

## 2018-01-01 RX ADMIN — FOLIC ACID 1 MG: 1 TABLET ORAL at 08:51

## 2018-01-01 RX ADMIN — ONDANSETRON HYDROCHLORIDE 4 MG: 2 INJECTION, SOLUTION INTRAMUSCULAR; INTRAVENOUS at 16:48

## 2018-01-01 RX ADMIN — OXYCODONE AND ACETAMINOPHEN 1 TABLET: 10; 325 TABLET ORAL at 21:25

## 2018-01-01 RX ADMIN — ALPRAZOLAM 1 MG: 1 TABLET ORAL at 15:06

## 2018-01-01 RX ADMIN — OXYCODONE AND ACETAMINOPHEN 1 TABLET: 10; 325 TABLET ORAL at 21:56

## 2018-01-01 RX ADMIN — FERROUS SULFATE TAB 325 MG (65 MG ELEMENTAL FE) 325 MG: 325 (65 FE) TAB at 18:08

## 2018-01-01 RX ADMIN — OXYCODONE AND ACETAMINOPHEN 1 TABLET: 10; 325 TABLET ORAL at 17:48

## 2018-01-01 RX ADMIN — ALPRAZOLAM 1 MG: 1 TABLET ORAL at 13:12

## 2018-01-01 RX ADMIN — MIDODRINE HYDROCHLORIDE 5 MG: 5 TABLET ORAL at 09:16

## 2018-01-01 RX ADMIN — LEVETIRACETAM 1000 MG: 500 TABLET, FILM COATED ORAL at 08:44

## 2018-01-01 RX ADMIN — SODIUM CHLORIDE: 9 INJECTION, SOLUTION INTRAVENOUS at 00:55

## 2018-01-01 RX ADMIN — FERROUS SULFATE TAB 325 MG (65 MG ELEMENTAL FE) 325 MG: 325 (65 FE) TAB at 09:23

## 2018-01-01 RX ADMIN — OXYCODONE HYDROCHLORIDE AND ACETAMINOPHEN 1 TABLET: 5; 325 TABLET ORAL at 14:19

## 2018-01-01 RX ADMIN — MIDODRINE HYDROCHLORIDE 5 MG: 5 TABLET ORAL at 09:04

## 2018-01-01 RX ADMIN — LEVETIRACETAM 1000 MG: 500 TABLET, FILM COATED ORAL at 20:26

## 2018-01-01 RX ADMIN — AMOXICILLIN AND CLAVULANATE POTASSIUM 1 TABLET: 875; 125 TABLET, FILM COATED ORAL at 21:38

## 2018-01-01 RX ADMIN — SODIUM CHLORIDE: 9 INJECTION, SOLUTION INTRAVENOUS at 22:16

## 2018-01-01 RX ADMIN — GABAPENTIN 800 MG: 400 CAPSULE ORAL at 22:13

## 2018-01-01 RX ADMIN — MULTIPLE VITAMINS W/ MINERALS TAB 1 TABLET: TAB at 09:35

## 2018-01-01 RX ADMIN — OXYCODONE AND ACETAMINOPHEN 1 TABLET: 10; 325 TABLET ORAL at 20:27

## 2018-01-01 RX ADMIN — GABAPENTIN 800 MG: 400 CAPSULE ORAL at 21:22

## 2018-01-01 RX ADMIN — SODIUM CHLORIDE: 9 INJECTION, SOLUTION INTRAVENOUS at 19:19

## 2018-01-01 RX ADMIN — LEVETIRACETAM 1000 MG: 500 TABLET, FILM COATED ORAL at 22:13

## 2018-01-01 RX ADMIN — LEVETIRACETAM 1000 MG: 500 TABLET, FILM COATED ORAL at 22:04

## 2018-01-01 RX ADMIN — ALPRAZOLAM 1 MG: 1 TABLET ORAL at 15:58

## 2018-01-01 RX ADMIN — Medication 10 ML: at 09:14

## 2018-01-01 RX ADMIN — ALPRAZOLAM 1 MG: 1 TABLET ORAL at 15:42

## 2018-01-01 RX ADMIN — FOLIC ACID 1 MG: 1 TABLET ORAL at 14:06

## 2018-01-01 RX ADMIN — SODIUM CHLORIDE: 9 INJECTION, SOLUTION INTRAVENOUS at 03:23

## 2018-01-01 RX ADMIN — VENLAFAXINE 75 MG: 75 TABLET ORAL at 09:14

## 2018-01-01 RX ADMIN — Medication 10 ML: at 09:08

## 2018-01-01 RX ADMIN — OXYCODONE AND ACETAMINOPHEN 1 TABLET: 10; 325 TABLET ORAL at 15:04

## 2018-01-01 RX ADMIN — Medication 10 ML: at 21:26

## 2018-01-01 RX ADMIN — GABAPENTIN 800 MG: 400 CAPSULE ORAL at 09:14

## 2018-01-01 RX ADMIN — MULTIPLE VITAMINS W/ MINERALS TAB 1 TABLET: TAB at 14:05

## 2018-01-01 RX ADMIN — OXYCODONE HYDROCHLORIDE AND ACETAMINOPHEN 1 TABLET: 5; 325 TABLET ORAL at 06:19

## 2018-01-01 RX ADMIN — SODIUM CHLORIDE: 9 INJECTION, SOLUTION INTRAVENOUS at 14:05

## 2018-01-01 RX ADMIN — GABAPENTIN 800 MG: 400 CAPSULE ORAL at 10:03

## 2018-01-01 RX ADMIN — FAMOTIDINE 20 MG: 20 TABLET ORAL at 19:42

## 2018-01-01 RX ADMIN — FAMOTIDINE 20 MG: 20 TABLET ORAL at 23:35

## 2018-01-01 RX ADMIN — SODIUM CHLORIDE: 9 INJECTION, SOLUTION INTRAVENOUS at 17:36

## 2018-01-01 RX ADMIN — MULTIPLE VITAMINS W/ MINERALS TAB 1 TABLET: TAB at 09:02

## 2018-01-01 RX ADMIN — OXYCODONE HYDROCHLORIDE AND ACETAMINOPHEN 1 TABLET: 5; 325 TABLET ORAL at 12:48

## 2018-01-01 RX ADMIN — FERROUS SULFATE TAB 325 MG (65 MG ELEMENTAL FE) 325 MG: 325 (65 FE) TAB at 17:01

## 2018-01-01 RX ADMIN — Medication 10 ML: at 09:33

## 2018-01-01 RX ADMIN — PETROLATUM: 42 OINTMENT TOPICAL at 17:59

## 2018-01-01 RX ADMIN — MIDODRINE HYDROCHLORIDE 5 MG: 5 TABLET ORAL at 09:30

## 2018-01-01 RX ADMIN — ALPRAZOLAM 1 MG: 1 TABLET ORAL at 17:36

## 2018-01-01 RX ADMIN — MELATONIN 3 MG ORAL TABLET 3 MG: 3 TABLET ORAL at 21:24

## 2018-01-01 RX ADMIN — METOPROLOL SUCCINATE 12.5 MG: 25 TABLET, EXTENDED RELEASE ORAL at 09:30

## 2018-01-01 RX ADMIN — FOLIC ACID 1 MG: 1 TABLET ORAL at 08:44

## 2018-01-01 RX ADMIN — METOPROLOL TARTRATE 12.5 MG: 25 TABLET ORAL at 22:33

## 2018-01-01 RX ADMIN — SODIUM CHLORIDE 500 ML: 9 INJECTION, SOLUTION INTRAVENOUS at 10:57

## 2018-01-01 RX ADMIN — MORPHINE SULFATE 1 MG: 2 INJECTION, SOLUTION INTRAMUSCULAR; INTRAVENOUS at 01:57

## 2018-01-01 RX ADMIN — FAMOTIDINE 20 MG: 20 TABLET ORAL at 09:07

## 2018-01-01 RX ADMIN — VENLAFAXINE 75 MG: 75 TABLET ORAL at 11:42

## 2018-01-01 RX ADMIN — Medication 10 ML: at 22:05

## 2018-01-01 RX ADMIN — GABAPENTIN 800 MG: 400 CAPSULE ORAL at 13:43

## 2018-01-01 RX ADMIN — METHYLPREDNISOLONE SODIUM SUCCINATE 40 MG: 40 INJECTION, POWDER, LYOPHILIZED, FOR SOLUTION INTRAMUSCULAR; INTRAVENOUS at 01:18

## 2018-01-01 RX ADMIN — MIDODRINE HYDROCHLORIDE 5 MG: 5 TABLET ORAL at 09:15

## 2018-01-01 RX ADMIN — OXYCODONE AND ACETAMINOPHEN 1 TABLET: 10; 325 TABLET ORAL at 18:17

## 2018-01-01 RX ADMIN — LEVETIRACETAM 1000 MG: 500 TABLET, FILM COATED ORAL at 23:35

## 2018-01-01 RX ADMIN — VENLAFAXINE 75 MG: 75 TABLET ORAL at 09:29

## 2018-01-01 RX ADMIN — Medication 10 ML: at 09:05

## 2018-01-01 RX ADMIN — GABAPENTIN 800 MG: 400 CAPSULE ORAL at 20:54

## 2018-01-01 RX ADMIN — MORPHINE SULFATE 1 MG: 2 INJECTION, SOLUTION INTRAMUSCULAR; INTRAVENOUS at 23:21

## 2018-01-01 RX ADMIN — GABAPENTIN 800 MG: 400 CAPSULE ORAL at 09:10

## 2018-01-01 RX ADMIN — OXYCODONE AND ACETAMINOPHEN 1 TABLET: 10; 325 TABLET ORAL at 06:54

## 2018-01-01 RX ADMIN — MELATONIN 3 MG ORAL TABLET 3 MG: 3 TABLET ORAL at 22:13

## 2018-01-01 RX ADMIN — ALPRAZOLAM 1 MG: 1 TABLET ORAL at 22:00

## 2018-01-01 RX ADMIN — MELATONIN 3 MG ORAL TABLET 3 MG: 3 TABLET ORAL at 21:38

## 2018-01-01 RX ADMIN — Medication 10 ML: at 22:04

## 2018-01-01 RX ADMIN — MULTIPLE VITAMINS W/ MINERALS TAB 1 TABLET: TAB at 09:14

## 2018-01-01 RX ADMIN — FAMOTIDINE 20 MG: 20 TABLET ORAL at 22:18

## 2018-01-01 RX ADMIN — OXYCODONE HYDROCHLORIDE AND ACETAMINOPHEN 1 TABLET: 5; 325 TABLET ORAL at 01:30

## 2018-01-01 RX ADMIN — LEVETIRACETAM 1000 MG: 500 TABLET, FILM COATED ORAL at 09:10

## 2018-01-01 RX ADMIN — VENLAFAXINE 75 MG: 75 TABLET ORAL at 14:06

## 2018-01-01 RX ADMIN — GABAPENTIN 800 MG: 400 CAPSULE ORAL at 08:44

## 2018-01-01 RX ADMIN — METOPROLOL SUCCINATE 12.5 MG: 25 TABLET, EXTENDED RELEASE ORAL at 10:02

## 2018-01-01 RX ADMIN — MULTIPLE VITAMINS W/ MINERALS TAB 1 TABLET: TAB at 08:09

## 2018-01-01 RX ADMIN — FOLIC ACID 1 MG: 1 TABLET ORAL at 09:05

## 2018-01-01 RX ADMIN — SODIUM CHLORIDE 1000 ML: 9 INJECTION, SOLUTION INTRAVENOUS at 23:55

## 2018-01-01 RX ADMIN — ACETAMINOPHEN 650 MG: 325 TABLET ORAL at 18:56

## 2018-01-01 RX ADMIN — OXYCODONE HYDROCHLORIDE AND ACETAMINOPHEN 1 TABLET: 5; 325 TABLET ORAL at 20:57

## 2018-01-01 RX ADMIN — GABAPENTIN 800 MG: 400 CAPSULE ORAL at 09:20

## 2018-01-01 RX ADMIN — ALPRAZOLAM 1 MG: 1 TABLET ORAL at 21:20

## 2018-01-01 RX ADMIN — GABAPENTIN 800 MG: 400 CAPSULE ORAL at 09:54

## 2018-01-01 RX ADMIN — ALPRAZOLAM 1 MG: 1 TABLET ORAL at 23:53

## 2018-01-01 RX ADMIN — CEFDINIR 300 MG: 300 CAPSULE ORAL at 08:07

## 2018-01-01 ASSESSMENT — PAIN DESCRIPTION - DESCRIPTORS
DESCRIPTORS: ACHING;CONSTANT;DISCOMFORT
DESCRIPTORS: ACHING
DESCRIPTORS: PATIENT UNABLE TO DESCRIBE
DESCRIPTORS: ACHING;CONSTANT;DISCOMFORT
DESCRIPTORS: ACHING;CONSTANT;DISCOMFORT
DESCRIPTORS: ACHING;DISCOMFORT
DESCRIPTORS: ACHING;DISCOMFORT
DESCRIPTORS: BURNING;DISCOMFORT
DESCRIPTORS: ACHING
DESCRIPTORS: ACHING;CONSTANT;DISCOMFORT
DESCRIPTORS: ACHING;CONSTANT
DESCRIPTORS: ACHING;CONSTANT;DISCOMFORT
DESCRIPTORS: ACHING;DISCOMFORT
DESCRIPTORS: ACHING;DISCOMFORT
DESCRIPTORS: ACHING;CONSTANT;DISCOMFORT
DESCRIPTORS: ACHING;CONSTANT;DISCOMFORT
DESCRIPTORS: ACHING;DISCOMFORT;SORE
DESCRIPTORS: ACHING;DISCOMFORT
DESCRIPTORS: ACHING;DISCOMFORT
DESCRIPTORS: ACHING;SORE;DISCOMFORT
DESCRIPTORS: HEADACHE
DESCRIPTORS: DULL;ACHING
DESCRIPTORS: ACHING;DISCOMFORT
DESCRIPTORS: ACHING;CONSTANT;DISCOMFORT
DESCRIPTORS: ACHING;DISCOMFORT;CONSTANT
DESCRIPTORS: ACHING;CONSTANT;DISCOMFORT
DESCRIPTORS: ACHING;CONSTANT;DISCOMFORT
DESCRIPTORS: ACHING;CONSTANT
DESCRIPTORS: CONSTANT;DISCOMFORT
DESCRIPTORS: ACHING;DISCOMFORT
DESCRIPTORS: HEADACHE
DESCRIPTORS: ACHING;CONSTANT;DISCOMFORT
DESCRIPTORS: ACHING;DISCOMFORT
DESCRIPTORS: CONSTANT;DISCOMFORT
DESCRIPTORS: BURNING;DISCOMFORT
DESCRIPTORS: ACHING;DISCOMFORT
DESCRIPTORS: ACHING;DISCOMFORT
DESCRIPTORS: CONSTANT;DISCOMFORT
DESCRIPTORS: CONSTANT;ACHING;DISCOMFORT
DESCRIPTORS: ACHING;DISCOMFORT;CONSTANT;HEADACHE
DESCRIPTORS: ACHING;DISCOMFORT
DESCRIPTORS: ACHING;BURNING;DISCOMFORT
DESCRIPTORS: CONSTANT;DISCOMFORT
DESCRIPTORS: ACHING;DISCOMFORT
DESCRIPTORS: CONSTANT;ACHING;DISCOMFORT
DESCRIPTORS: ACHING;DISCOMFORT;SORE
DESCRIPTORS: ACHING;DISCOMFORT
DESCRIPTORS: ACHING;CONSTANT;DISCOMFORT
DESCRIPTORS: ACHING;SORE;DISCOMFORT
DESCRIPTORS: ACHING;DISCOMFORT
DESCRIPTORS: ACHING;CONSTANT;DISCOMFORT
DESCRIPTORS: ACHING;DISCOMFORT;CONSTANT
DESCRIPTORS: ACHING;DISCOMFORT
DESCRIPTORS: ACHING;CONSTANT;DISCOMFORT
DESCRIPTORS: ACHING;DISCOMFORT
DESCRIPTORS: ACHING;CONSTANT;DISCOMFORT

## 2018-01-01 ASSESSMENT — PAIN DESCRIPTION - PROGRESSION
CLINICAL_PROGRESSION: NOT CHANGED
CLINICAL_PROGRESSION: GRADUALLY WORSENING
CLINICAL_PROGRESSION: NOT CHANGED
CLINICAL_PROGRESSION: GRADUALLY WORSENING
CLINICAL_PROGRESSION: NOT CHANGED
CLINICAL_PROGRESSION: GRADUALLY WORSENING
CLINICAL_PROGRESSION: NOT CHANGED
CLINICAL_PROGRESSION: GRADUALLY WORSENING
CLINICAL_PROGRESSION: NOT CHANGED
CLINICAL_PROGRESSION: GRADUALLY WORSENING
CLINICAL_PROGRESSION: NOT CHANGED
CLINICAL_PROGRESSION: NOT CHANGED
CLINICAL_PROGRESSION: GRADUALLY WORSENING
CLINICAL_PROGRESSION: NOT CHANGED

## 2018-01-01 ASSESSMENT — PAIN DESCRIPTION - LOCATION
LOCATION: GENERALIZED
LOCATION: GENERALIZED;BUTTOCKS
LOCATION: GENERALIZED
LOCATION: BUTTOCKS
LOCATION: GENERALIZED
LOCATION: GENERALIZED
LOCATION: BUTTOCKS
LOCATION: GENERALIZED
LOCATION: BUTTOCKS;GENERALIZED
LOCATION: GENERALIZED
LOCATION: HEAD
LOCATION: GENERALIZED
LOCATION: GENERALIZED;BUTTOCKS
LOCATION: BUTTOCKS;GENERALIZED
LOCATION: GENERALIZED
LOCATION: GENERALIZED;HEAD
LOCATION: GENERALIZED
LOCATION: GENERALIZED;BUTTOCKS
LOCATION: GENERALIZED
LOCATION: HEAD
LOCATION: GENERALIZED
LOCATION: GENERALIZED
LOCATION: GENERALIZED;SHOULDER
LOCATION: GENERALIZED

## 2018-01-01 ASSESSMENT — PAIN SCALES - GENERAL
PAINLEVEL_OUTOF10: 10
PAINLEVEL_OUTOF10: 9
PAINLEVEL_OUTOF10: 10
PAINLEVEL_OUTOF10: 6
PAINLEVEL_OUTOF10: 0
PAINLEVEL_OUTOF10: 10
PAINLEVEL_OUTOF10: 10
PAINLEVEL_OUTOF10: 6
PAINLEVEL_OUTOF10: 10
PAINLEVEL_OUTOF10: 7
PAINLEVEL_OUTOF10: 0
PAINLEVEL_OUTOF10: 7
PAINLEVEL_OUTOF10: 10
PAINLEVEL_OUTOF10: 0
PAINLEVEL_OUTOF10: 10
PAINLEVEL_OUTOF10: 9
PAINLEVEL_OUTOF10: 10
PAINLEVEL_OUTOF10: 0
PAINLEVEL_OUTOF10: 0
PAINLEVEL_OUTOF10: 10
PAINLEVEL_OUTOF10: 10
PAINLEVEL_OUTOF10: 0
PAINLEVEL_OUTOF10: 10
PAINLEVEL_OUTOF10: 0
PAINLEVEL_OUTOF10: 0
PAINLEVEL_OUTOF10: 10
PAINLEVEL_OUTOF10: 0
PAINLEVEL_OUTOF10: 5
PAINLEVEL_OUTOF10: 10
PAINLEVEL_OUTOF10: 0
PAINLEVEL_OUTOF10: 10
PAINLEVEL_OUTOF10: 5
PAINLEVEL_OUTOF10: 10
PAINLEVEL_OUTOF10: 6
PAINLEVEL_OUTOF10: 10
PAINLEVEL_OUTOF10: 7
PAINLEVEL_OUTOF10: 10
PAINLEVEL_OUTOF10: 10
PAINLEVEL_OUTOF10: 8
PAINLEVEL_OUTOF10: 10
PAINLEVEL_OUTOF10: 7
PAINLEVEL_OUTOF10: 10
PAINLEVEL_OUTOF10: 0
PAINLEVEL_OUTOF10: 10
PAINLEVEL_OUTOF10: 7
PAINLEVEL_OUTOF10: 10
PAINLEVEL_OUTOF10: 7
PAINLEVEL_OUTOF10: 10
PAINLEVEL_OUTOF10: 10
PAINLEVEL_OUTOF10: 0
PAINLEVEL_OUTOF10: 5
PAINLEVEL_OUTOF10: 10
PAINLEVEL_OUTOF10: 10
PAINLEVEL_OUTOF10: 0
PAINLEVEL_OUTOF10: 0
PAINLEVEL_OUTOF10: 8
PAINLEVEL_OUTOF10: 10
PAINLEVEL_OUTOF10: 4
PAINLEVEL_OUTOF10: 0
PAINLEVEL_OUTOF10: 0
PAINLEVEL_OUTOF10: 10
PAINLEVEL_OUTOF10: 10
PAINLEVEL_OUTOF10: 7
PAINLEVEL_OUTOF10: 10
PAINLEVEL_OUTOF10: 10
PAINLEVEL_OUTOF10: 9
PAINLEVEL_OUTOF10: 8
PAINLEVEL_OUTOF10: 6
PAINLEVEL_OUTOF10: 4
PAINLEVEL_OUTOF10: 8
PAINLEVEL_OUTOF10: 0
PAINLEVEL_OUTOF10: 7
PAINLEVEL_OUTOF10: 10
PAINLEVEL_OUTOF10: 0
PAINLEVEL_OUTOF10: 10
PAINLEVEL_OUTOF10: 7
PAINLEVEL_OUTOF10: 10
PAINLEVEL_OUTOF10: 10
PAINLEVEL_OUTOF10: 9
PAINLEVEL_OUTOF10: 10
PAINLEVEL_OUTOF10: 3
PAINLEVEL_OUTOF10: 0
PAINLEVEL_OUTOF10: 10
PAINLEVEL_OUTOF10: 9
PAINLEVEL_OUTOF10: 7
PAINLEVEL_OUTOF10: 10
PAINLEVEL_OUTOF10: 5
PAINLEVEL_OUTOF10: 10
PAINLEVEL_OUTOF10: 9
PAINLEVEL_OUTOF10: 0
PAINLEVEL_OUTOF10: 10
PAINLEVEL_OUTOF10: 7
PAINLEVEL_OUTOF10: 9
PAINLEVEL_OUTOF10: 10

## 2018-01-01 ASSESSMENT — PAIN DESCRIPTION - FREQUENCY
FREQUENCY: CONTINUOUS

## 2018-01-01 ASSESSMENT — PAIN DESCRIPTION - PAIN TYPE
TYPE: CHRONIC PAIN;ACUTE PAIN
TYPE: CHRONIC PAIN
TYPE: ACUTE PAIN
TYPE: CHRONIC PAIN
TYPE: ACUTE PAIN
TYPE: CHRONIC PAIN
TYPE: CHRONIC PAIN

## 2018-01-01 ASSESSMENT — PAIN DESCRIPTION - ONSET
ONSET: ON-GOING

## 2018-01-01 ASSESSMENT — ENCOUNTER SYMPTOMS
VOMITING: 0
SORE THROAT: 0
COUGH: 0
DIARRHEA: 0
BACK PAIN: 0
ABDOMINAL PAIN: 0
TROUBLE SWALLOWING: 0
NAUSEA: 0
EYE REDNESS: 0
SHORTNESS OF BREATH: 0

## 2018-01-01 ASSESSMENT — PAIN DESCRIPTION - ORIENTATION
ORIENTATION: LEFT
ORIENTATION: LEFT
ORIENTATION: OTHER (COMMENT)

## 2018-01-01 ASSESSMENT — PAIN SCALES - WONG BAKER
WONGBAKER_NUMERICALRESPONSE: 2
WONGBAKER_NUMERICALRESPONSE: 0
WONGBAKER_NUMERICALRESPONSE: 2
WONGBAKER_NUMERICALRESPONSE: 2

## 2018-07-11 NOTE — H&P
times daily ), Disp: 60 tablet, Rfl: 0    Multiple Vitamins-Minerals (THERAPEUTIC MULTIVITAMIN-MINERALS) tablet, Take 1 tablet by mouth daily, Disp: 30 tablet, Rfl: 0    senna (SENOKOT) 8.6 MG tablet, Take 1 tablet by mouth 2 times daily, Disp: 60 tablet, Rfl: 0    baclofen (LIORESAL) 10 MG tablet, Take 0.5 tablets by mouth 4 times daily (Patient taking differently: Take 10 mg by mouth 3 times daily ), Disp: 120 tablet, Rfl: 0    No Known Allergies    Past Medical History:   Diagnosis Date    Anxiety     Chronic pain     Chronic respiratory failure (HCC)     s/p tracheostomy / was removed 9/2017, stoma remains    Depression     Guillain Barré syndrome (HCC)     Hepatitis C reactive     History of drug abuse     Iron deficiency anemia     baseline hemoglobin 8-10    Local infection due to Huddleston catheter     Myasthenia gravis ([address]     Neurologic disorder     Guillain-[place] Syndrome variant    Neuromuscular dysfunction of bladder     has a supra pubic catheter    Paraplegia ([address]     due to traumatic spinal cord injury    Personality disorder     Pressure sore     bilateral buttocks    Seizures ([address]     stable       Past Surgical History:   Procedure Laterality Date    COLOSTOMY  10/04/2016    open diverting    GASTROSTOMY TUBE PLACEMENT  07/17/2014    and removed    OTHER SURGICAL HISTORY  12/30/15    Excision debridement of sacral and buttock wounds     OTHER SURGICAL HISTORY Bilateral 04/25/2016    I7D Bilateral sacral wounds    OTHER SURGICAL HISTORY  05/20/2017    triple lumen catheter insertion/ tesio removal    OTHER SURGICAL HISTORY  12/22/2017    REMOVAL OF MEDIPORT (infected)    ID PERCUT TRACH PUNCT/ASPIRATN  7/10/2014         THYMECTOMY      TRACHEOSTOMY      TUNNELED VENOUS CATHETER PLACEMENT         Family History   Problem Relation Age of Onset    Cancer Mother         esophageal    Cancer Father         stomach/brain    No Known Problems Sister     No Known Problems

## 2018-07-11 NOTE — PROGRESS NOTES
Brief Procedure Note    Patient Name: Lele Alexander   Medical Record Number: [id number]  Date: 7/11/2018   Time: 11:53 AM   Room/Bed: Room/bed info not found    Preoperative diagnosis: Need for long-term intravenous access    Postoperative diagnosis: Same    Procedure: PICC line placement    Anesthesia: 10 mL lidocaine administered subcutaneously    Estimated blood loss: Minimal    Complications: None    Implants: None    Under sterile conditions and sonographic/fluoroscopic guidance, a double-lumen 5 [place] PICC line was placed via the right basilic vein. The internal length of the PICC line measures 36cm. The patient tolerated the procedure without complications and was transferred in stable condition. PICC line ready for immediate use.       Electronically signed by Javier Joseph MD on 7/11/2018 at 11:53 AM

## 2018-08-25 PROBLEM — N30.01 ACUTE CYSTITIS WITH HEMATURIA: Status: ACTIVE | Noted: 2018-01-01

## 2018-08-25 NOTE — ED PROVIDER NOTES
Musculoskeletal: Negative for back pain. Skin: Positive for wound. Negative for rash. Neurological: Negative for numbness and headaches. All other systems reviewed and are negative. Physical Exam   Constitutional: She is oriented to person, place, and time. She appears cachectic. She appears ill. Chronically ill appearing paralyzed female, wheelchair bound, cachectic. HENT:   Head: Normocephalic and atraumatic. Eyes: Pupils are equal, round, and reactive to light. Conjunctivae and EOM are normal.   Neck: Normal range of motion. Neck supple. Cardiovascular: Regular rhythm and normal heart sounds. No murmur heard. tachycardic   Pulmonary/Chest: Effort normal and breath sounds normal. No respiratory distress. She has no wheezes. She has no rales. She exhibits no tenderness. Tracheostomy hole covered with gauze, no erythema surrounding site. No active drainage. Abdominal: Soft. Bowel sounds are normal. She exhibits no distension and no mass. There is no tenderness. There is no rebound and no guarding. Ostomy in the left lower quadrant draining brown stool, clear liquid leaking from sides of bag    Suprapubic catheter with mild amount of erythema surrounding the catheter, catheter is full of sediment and bag is full of dark urine   Musculoskeletal: She exhibits edema (bilateral nonpitting edema). Neurological: She is alert and oriented to person, place, and time. No cranial nerve deficit. Coordination normal.   Skin: Skin is warm and dry. Wound on the posterior aspect of the right heel, dry and scabbed over, not actively draining.      Wound on the right lateral posterior hip stage 3, actively draining blood and clear liquid    Wound on the sacrum stage 4, large and actively draining blood and white liquid    Wound on the left buttocks stage 4, large and actively draining blood    Wound on the superior anal ridge actively draining blood     Wound on the posterior aspect of the left Ref Range    Magnesium 2.0 1.6 - 2.6 mg/dL       RADIOLOGY:  XR CHEST PORTABLE   Final Result      1. No acute pleuroparenchymal disease.                            ------------------------- NURSING NOTES AND VITALS REVIEWED ---------------------------  Date / Time Roomed:  8/25/2018 12:33 PM  ED Bed Assignment:  10/10    The nursing notes within the ED encounter and vital signs as below have been reviewed. Patient Vitals for the past 24 hrs:   BP Temp Temp src Pulse Resp SpO2 Height Weight   08/25/18 1625 119/76 - - 104 16 98 % - -   08/25/18 1522 87/65 - - 118 16 - - -   08/25/18 1439 (!) 99/57 - - 119 18 99 % - -   08/25/18 1239 100/70 98.8 °F (37.1 °C) Oral 135 20 96 % 5' 7\" (1.702 m) 100 lb (45.4 kg)       Oxygen Saturation Interpretation: Normal    ------------------------------------------ PROGRESS NOTES ------------------------------------------  Re-evaluation(s):  Time: 1640  Patients symptoms show no change  Repeat physical examination is not changed    Counseling:  I have spoken with the patient and discussed todays results, in addition to providing specific details for the plan of care and counseling regarding the diagnosis and prognosis. Their questions are answered at this time and they are agreeable with the plan of admission.    --------------------------------- ADDITIONAL PROVIDER NOTES ---------------------------------  Consultations:  Time: 1644. Spoke with Dr. Geno Crisostomo. Discussed case. They will admit the patient. This patient's ED course included: a personal history and physicial examination, re-evaluation prior to disposition, multiple bedside re-evaluations, IV medications, cardiac monitoring, continuous pulse oximetry and complex medical decision making and emergency management    This patient has remained hemodynamically stable during their ED course. Diagnosis:  1. Acute cystitis with hematuria    2.  Wound of sacral region, subsequent encounter        Disposition:  Patient's disposition: Admit to med/surg floor  Patient's condition is stable.                Serene Garvey DO  Resident  08/25/18 [id number]

## 2018-08-25 NOTE — H&P
[place] [address] [facility]   History and Physical      CHIEF COMPLAINT: fever and chills    History of Present Illness: Patient is a 21 yo female with a significant past medical history of paraplegic secondary to [address] syndrome (2014), chronic respiratory s/p trach, anxiety, Hepatitis C, seizures, sacral decubital ulcers with diverting colostomy, huddleston, and chronic suprapubic sanches catheter presented for fever and chills. Patient lived with her friends and motels after she was discharged from [facility] 1 month ago. She reports her father has cancer and brother has 5 children so nobody can take care of her. Patient has not followed with wound care. She used pads for her wounds. She c/o 10/10 pain. Her suprapubic urinary catheter was not changed for a long time and it was not draining well. She states \"it was nasty\". Patient has ostomy bag; her friend helped her empty it. Yesterday she had a subjective fever and some chills. No abdominal pain or N/V. Informant(s) for H&P:EMR and patient     REVIEW OF SYSTEMS:  no cp, sob, n/v, ha, vision/hearing changes, wt changes, hot/cold flashes,  constipation, unless noted in HPI. Complete ROS performed with the patient and is otherwise negative.     PMH:  Past Medical History:   Diagnosis Date    Anxiety     Blunt force injury     Chronic pain     Chronic respiratory failure ([address]     s/p tracheostomy / was removed 9/2017, stoma remains    Depression     Guillain Barré syndrome (HCC)     Hepatitis C reactive     History of drug abuse     Iron deficiency anemia     baseline hemoglobin 8-10    Local infection due to Huddleston catheter     Myasthenia gravis ([address]     Neurologic disorder     Guillain-[place] Syndrome variant    Neuromuscular dysfunction of bladder     has a supra pubic catheter    Paraplegia ([address]     due to traumatic spinal cord injury    Personality disorder     Pressure sore     bilateral buttocks    Seizures ([address] stable    UTI (urinary tract infection) 5/17/2017       Surgical History:  Past Surgical History:   Procedure Laterality Date    COLOSTOMY  10/04/2016    open diverting    GASTROSTOMY TUBE PLACEMENT  07/17/2014    and removed    OTHER SURGICAL HISTORY  12/30/15    Excision debridement of sacral and buttock wounds     OTHER SURGICAL HISTORY Bilateral 04/25/2016    I7D Bilateral sacral wounds    OTHER SURGICAL HISTORY  05/20/2017    triple lumen catheter insertion/ tesio removal    OTHER SURGICAL HISTORY  12/22/2017    REMOVAL OF MEDIPORT (infected)    NM PERCUT TRACH PUNCT/ASPIRATN  7/10/2014         THYMECTOMY      TRACHEOSTOMY      TUNNELED VENOUS CATHETER PLACEMENT         Medications Prior to Admission:    Prior to Admission medications    Medication Sig Start Date End Date Taking? Authorizing Provider   tamsulosin (FLOMAX) 0.4 MG capsule Take 0.4 mg by mouth nightly   Yes Historical Provider, MD   diphenhydrAMINE (BENADRYL) 25 MG capsule Take 25 mg by mouth every 4 hours as needed for Itching   Yes Historical Provider, MD   calcium carbonate (TUMS) 500 MG chewable tablet Take 1 tablet by mouth every 4 hours as needed for Heartburn   Yes Historical Provider, MD   hydrOXYzine (ATARAX) 50 MG tablet Take 50 mg by mouth nightly as needed (insomnia)   Yes Historical Provider, MD   Cranberry 450 MG CAPS Take by mouth daily LD 12/20/2017   Yes Historical Provider, MD   Menthol, Topical Analgesic, (BIOFREEZE EX) Apply topically 2 times daily as needed   Yes Historical Provider, MD   phenazopyridine (PYRIDIUM) 100 MG tablet Take 100 mg by mouth 3 times daily as needed for Pain   Yes Historical Provider, MD   gabapentin (NEURONTIN) 300 MG capsule Take 300 mg by mouth 3 times daily Instructed to take morning of surgery with a sip of water   Yes Historical Provider, MD   ALPRAZolam (XANAX) 1 MG tablet Take 1 mg by mouth 3 times daily  Instructed to take morning of surgery with a sip of water.    Yes Historical sounds are normal. She exhibits no distension. There is no tenderness. Ostomy present    Musculoskeletal: She exhibits deformity. She exhibits no edema or tenderness. Neurological: She is alert and oriented to person, place, and time. Skin:   Multiple wounds on the buttocks and sides of the thigh. No active bleeding. Some ulcers with drainage. Psychiatric: She has a normal mood and affect. LABS:  Recent Labs      18   1425   NA  138   K  3.4*   CL  101   CO2  26   BUN  22*   CREATININE  0.3*   GLUCOSE  81   CALCIUM  8.8       Recent Labs      18   1425   WBC  13.2*   RBC  3.88   HGB  9.9*   HCT  32.6*   MCV  84.0   MCH  25.5*   MCHC  30.4*   RDW  17.8*   PLT  458*   MPV  8.9       No results for input(s): POCGLU in the last 72 hours. Magnesium:    Lab Results   Component Value Date    MG 2.0 2018       Radiology: Xr Chest Portable    Result Date: 2018  Patient MRN:  [id number] : 1995 Age: 24 years Gender: Female Order Date:  2018 1:30 PM EXAM: XR CHEST PORTABLE NUMBER OF IMAGES:  1 view INDICATION: Cough COMPARISON: Chest x-ray May 23, 2017 FINDINGS:  Cardiac silhouette is not enlarged. Mediastinal contour is unremarkable. Lungs are clear. No pleural effusion or pneumothorax. Upper abdomen is unremarkable. Levoscoliosis of the thoracic spine and the chest cage deformity. 1. No acute pleuroparenchymal disease. ASSESSMENT:      Active Problems:    Acute cystitis with hematuria  Resolved Problems:    * No resolved hospital problems. *      PLAN:    1. Sepsis from UTI: tachycardia plus leukocytosis with positive UA. Pan culture sent. Received cefepime in the ER. Will add 1 dose of vancomycin. Discussed with Dr. Watson Apley. 2. Multiple wounds on buttocks and hips: does not look infected. Wound culture sent from ER. Wound care consult. Morphine and percocet prn for pain. 3. Hypotension due to dehydration: BP improved with IVF bolus. Continue IVF.  Resume home midodrine. 4. Severe protein calorie malnutrition: dietician consult   5. Normocytic anemia: check iron studies and peripheral smear. 6. Thrombocytosis: can be reactive from #5 and dehydration. DVT prophylaxis: lovenox     Electronically signed by Joan Mullins MD on 8/25/2018 at 5:49 PM      NOTE: This report was transcribed using voice recognition software. Every effort was made to ensure accuracy; however, inadvertent computerized transcription errors may be present.

## 2018-08-25 NOTE — CONSULTS
[address] [place] [address] [facility]  Consultation Note     Admit Date: 8/25/2018 12:33 PM    Reason for Consult:   Sepsis    Attending Physician:  Juliana Malhotra DO    HISTORY OF PRESENT ILLNESS:             The history is obtained from extensive review of available past medical records. The patient is a 24 y.o. female who is known to the ID service. The patient has a history of a neurologic disorder. She has been diagnosed with Guillain-Barré syndrome in 2014. Patient has had a suprapubic catheter was placed by urology last year. The patient was at [facility] and signed herself out against medical advice and has been living with a friend since. The patient presented to the ED on 8/25/15 because she has multiple wounds on her buttocks and coccyx. He has been running a low-grade fever. Reports to having pain in buttock area in spite of paraplegia. The urine coming out of the suprapubic catheter has been cloudy and has had  an odor to it. Past Medical History:        Diagnosis Date    Anxiety     Blunt force injury     Chronic pain     Chronic respiratory failure ([address]     s/p tracheostomy / was removed 9/2017, stoma remains    Depression     Guillain Barré syndrome (HCC)     Hepatitis C reactive     History of drug abuse     Iron deficiency anemia     baseline hemoglobin 8-10    Local infection due to Huddleston catheter     Myasthenia gravis ([address]     Neurologic disorder     Guillain-[place] Syndrome variant    Neuromuscular dysfunction of bladder     has a supra pubic catheter    Paraplegia ([address]     due to traumatic spinal cord injury    Personality disorder     Pressure sore     bilateral buttocks    Seizures ([address]     stable     May 2017. Admitted to [facility] with unresponsiveness. Treated for possible drug overdose and pneumonia with Cefepime and vancomycin. Seen by ID and treated for osteomyelitis of the left inferior pubic ramus seen on CT.  Blood cultures grew May sensitive Enterococcus, Staphylococcus hominis, Peptostreptococcus and Corynebacterium species. Cultures of the left ischium grew Proteus mirabilis, Enterococcus faecalis and Corynebacterium species. Treated briefly with Daptomycin. He was discharged on Meropenem to a nursing facility. She was seen in follow-up over the course of the next few months in the office and finished a course of antibiotics. March 2017. Admitted to [facility] and seen by Dr. Niki Dasilva for bacteriuria which was felt to be colonization. Also asked to see for the pressure ulcer on the sacrum. Patient refused examination. No history of hepatitis C infection.     July 2016. Admitted to [facility] and treated for right lower lobe pneumonia secondary to Pseudomonas aeruginosa. Treated with cefepime, followed by inhaled tobramycin after she refused a central line. Noted to have a stage IV pressure ulcer over the coccyx.     February 2016. Admitted to [facility] and treated for cellulitis of the perianal area and thigh. Also diagnosed with osteomyelitis of the coccyx. Treated with Ceftaroline.     November 2015. Admitted to the hospital with aspiration pneumonia secondary to pseudomonas aeruginosa and treated with Zosyn and inhaled tobramycin.     Past Surgical History:        Procedure Laterality Date    COLOSTOMY  10/04/2016    open diverting    GASTROSTOMY TUBE PLACEMENT  07/17/2014    and removed    OTHER SURGICAL HISTORY  12/30/15    Excision debridement of sacral and buttock wounds     OTHER SURGICAL HISTORY Bilateral 04/25/2016    I7D Bilateral sacral wounds    OTHER SURGICAL HISTORY  05/20/2017    triple lumen catheter insertion/ tesio removal    OTHER SURGICAL HISTORY  12/22/2017    REMOVAL OF MEDIPORT (infected)    [place] PERCUT TRACH PUNCT/ASPIRATN  7/10/2014         THYMECTOMY      TRACHEOSTOMY      TUNNELED VENOUS CATHETER PLACEMENT       Current Medications:   Scheduled Meds:   cefepime  2 g Intravenous Once  vancomycin  1,000 mg Intravenous Once     Continuous Infusions:   sodium chloride 500 mL (08/25/18 1616)     PRN Meds:    Allergies:  Patient has no known allergies. Social History:   Social History     Social History    Marital status: Single     Spouse name: N/A    Number of children: N/A    Years of education: N/A     Occupational History    disbaled      Social History Main Topics    Smoking status: Current Every Day Smoker     Packs/day: 1.00     Years: 8.00     Types: Cigarettes    Smokeless tobacco: Never Used      Comment: 1 cigarette per week    Alcohol use No    Drug use: No      Comment: history of drug use    Sexual activity: Not Asked     Other Topics Concern    None     Social History Narrative    None      Family History:   Family History   Problem Relation Age of Onset    Cancer Mother         esophageal    Cancer Father         stomach/brain    No Known Problems Sister     No Known Problems Brother     No Known Problems Brother    . Otherwise non-pertinent to the chief complaint. REVIEW OF SYSTEMS:    Constitutional: Positive for fevers, chills, diaphoresis  Neurologic: Negative November syndrome. Neurogenic bladder  Psychiatric: Negative  Rheumatologic: Negative   Endocrine: Negative  Hematologic: Negative  Immunologic: Negative  ENT: Negative  Respiratory: Negative   Cardiovascular: Negative  GI: Negative  : Negative  Musculoskeletal: Negative  Skin: No rashes. PHYSICAL EXAM:    Vitals:   /76   Pulse 104   Temp 98.8 °F (37.1 °C) (Oral)   Resp 16   Ht 5' 7\" (1.702 m)   Wt 100 lb (45.4 kg)   SpO2 98%   BMI 15.66 kg/m²   Constitutional: The patient is awake, alert, and oriented. Her plegic. She is able to move upper extremities but has multiple contractures. Skin: Warm and dry. No rashes were noted. Multiple wounds over coccyx, left and right fish care, buttocks, and right heel.  A few of them have some slight necrosis of the edges but none of them are deep, tunneling or undermining. No surrounding cellulitis. HEENT: Eyes show round, and reactive pupils. No jaundice. Moist mucous membranes, no ulcerations, no thrush. Suprasternal ostomy from previous tracheostomy covered. She is able to speak by covering the ostomy. Neck: Supple to movements. No lymphadenopathy. Chest: No use of accessory muscles to breathe. Symmetrical expansion. Auscultation reveals no wheezing, crackles, or rhonchi. Cardiovascular: S1 and S2 are rhythmic and regular. No murmurs appreciated. Abdomen: Positive bowel sounds to auscultation. Benign to palpation. No masses felt. No hepatosplenomegaly. Suprapubic catheter with turbid urine. Sediment noted. Extremities: Some edema and lymphedema noted in the left leg. Edema over the right foot. .  Lines: peripheral      CBC+dif:  Recent Labs      08/25/18   1425   WBC  13.2*   HGB  9.9*   HCT  32.6*   MCV  84.0   PLT  458*     Lab Results   Component Value Date    CRP 0.3 06/02/2017    CRP 4.4 (H) 05/20/2017    CRP 0.8 (H) 12/02/2016      No results found for: CRPHS  Lab Results   Component Value Date    SEDRATE 66 (H) 06/02/2017    SEDRATE 106 (H) 05/20/2017    SEDRATE 61 (H) 11/28/2016     Lab Results   Component Value Date    ALT 9 08/25/2018    AST 12 08/25/2018    ALKPHOS 68 08/25/2018    BILITOT 0.3 08/25/2018     Lab Results   Component Value Date     08/25/2018    K 3.4 08/25/2018     08/25/2018    CO2 26 08/25/2018    BUN 22 08/25/2018    CREATININE 0.3 08/25/2018    GFRAA >60 08/25/2018    LABGLOM >60 08/25/2018    GLUCOSE 81 08/25/2018    PROT 8.1 08/25/2018    LABALBU 2.8 08/25/2018    CALCIUM 8.8 08/25/2018    BILITOT 0.3 08/25/2018    ALKPHOS 68 08/25/2018    AST 12 08/25/2018    ALT 9 08/25/2018       Lab Results   Component Value Date    PROTIME 14.1 03/11/2017    INR 1.3 03/11/2017       Lab Results   Component Value Date    TSH 2.350 11/28/2016       Lab Results   Component Value Date    COLORU BLOODY 08/25/2018

## 2018-08-26 NOTE — PROGRESS NOTES
[facility]   Progress Note    Admitting Date and Time: 8/25/2018 12:33 PM  Admit Dx: Acute cystitis with hematuria [N30.01]  Acute cystitis with hematuria [N30.01]    Subjective:    Pt feels the same. C/o 10/10 pain however she does not look distressed. She asked for methadone and stated that she was still taking it at home now she feels in withdrawal. When asked about the prescriber for her methadone she said multiple doctors wrote her that she doesn't not remember. The I asked her where she got the methadone. she said it was an old script and she filled at her pharmacy. She asked me why I need to know these. I explained to her that we need proof in order to resume her methadone; she became upset because she does not think morphine and percocet controled her pain. She does not follow with pain management outpatient. She is afebrile overnight. Charge nurse verified with patient's pharmacy. She was never prescribed methadone there. ROS: denies fever, chills, cp, sob, n/v, HA unless stated above.      sodium chloride flush  10 mL Intravenous 2 times per day    ferrous sulfate  325 mg Oral BID WC    folic acid  1 mg Oral Daily    levETIRAcetam  1,000 mg Oral BID    melatonin  3 mg Oral Nightly    therapeutic multivitamin-minerals  1 tablet Oral Daily    venlafaxine  75 mg Oral BID    sodium chloride flush  10 mL Intravenous 2 times per day    enoxaparin  40 mg Subcutaneous Daily    famotidine  20 mg Oral BID    nicotine  1 patch Transdermal Daily    gabapentin  800 mg Oral TID       sodium chloride flush 10 mL PRN   acetaminophen 650 mg Q4H PRN   morphine 1 mg Q4H PRN   acetaminophen 650 mg Q4H PRN   hydrOXYzine 50 mg Nightly PRN   ondansetron 4 mg Q4H PRN   oxyCODONE-acetaminophen 1 tablet Q4H PRN   sodium chloride flush 10 mL PRN   magnesium hydroxide 30 mL Daily PRN   ondansetron 4 mg Q6H PRN   naloxone 0.4 mg PRN        Objective:    BP (!) 141/89   Pulse 73   Temp

## 2018-08-26 NOTE — PROGRESS NOTES
Normocephalic   Eyes:    PERRL, conjunctiva/corneas clear       Throat:   No thrush, mucous membranes moist   Neck:   Supple, no lymphadenopathy   Lungs:     Clear to auscultation bilaterally, respirations even and unlabored   Heart:    Regular rate and rhythm, no murmur   Abdomen:     Soft, non-tender, + bowel sounds, no masses   Extremities:   Mild edema LEs. All extremities contracted   Skin:   Skin color, texture, turgor normal, no rashes   heplock no phlebitis  Cath with clear yellow urine       Data Review:    CBC With Differential:  Lab Results   Component Value Date    WBC 10.4 08/26/2018    RBC 4.21 08/26/2018    HGB 10.8 08/26/2018    HCT 36.5 08/26/2018     08/26/2018    MCV 86.7 08/26/2018    MCH 25.7 08/26/2018    MCHC 29.6 08/26/2018    RDW 17.8 08/26/2018    BANDSPCT 1 07/02/2014    LYMPHOPCT 23.2 08/26/2018    MONOPCT 4.2 08/26/2018    BASOPCT 0.1 08/26/2018    MONOSABS 0.44 08/26/2018    LYMPHSABS 2.41 08/26/2018    EOSABS 0.00 08/26/2018    BASOSABS 0.01 08/26/2018       CMP:  Lab Results   Component Value Date     08/26/2018    K 4.6 08/26/2018     08/26/2018    CO2 24 08/26/2018    BUN 17 08/26/2018    CREATININE 0.3 08/26/2018    GFRAA >60 08/26/2018    LABGLOM >60 08/26/2018    GLUCOSE 113 08/26/2018    PROT 8.1 08/25/2018    LABALBU 2.8 08/25/2018    CALCIUM 9.0 08/26/2018    BILITOT 0.3 08/25/2018    ALKPHOS 68 08/25/2018    AST 12 08/25/2018    ALT 9 08/25/2018       Radiology review:       Culture review:   Urine cx mixed gnr, gram + organism   Multiple decubiti cx noted  Blood cultures:      Assessment:   · Probable UTI with sepsis  · Leukocytosis secondary to UTI  · 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 Not infected clinically     Plan:    · Has cefepime allergy - rash. S/p vanco x1.  Will redose x1 again  · Start zosyn  · Monitor labs  · Call to micro to w/u urine cx   · Will follow with you    April Maksim Montgomery  8/26/2018     Patient seen and examined. I had a face to face encounter with the patient. Agree with exam, assessment and plan as outlined above. Addition and corrections were done as deemed appropriate. My exam and plan include: Patient did not tolerate Cefepime. She is back on Zosyn. Redosed 1 dose of Vancomycin. We asked microbiology to workup urine.     Fili Awad  8/26/2018

## 2018-08-27 PROBLEM — E43 SEVERE PROTEIN-CALORIE MALNUTRITION (HCC): Chronic | Status: ACTIVE | Noted: 2018-01-01

## 2018-08-27 NOTE — FLOWSHEET NOTE
4 cm   Wound Width (cm) 2.5 cm   Wound Depth (cm)  obs   Calculated Wound Size (cm^2) (l*w) 10 cm^2   Change in Wound Size % (l*w) -42.86   Wound Assessment Dry;Black   Drainage Amount None   Anastasiya-wound Assessment Dry   Wound 08/25/18 Leg Posterior; Left   Date First Assessed/Time First Assessed: 08/25/18 2312   Wound Event: Not known  Location: Leg  Wound Location Orientation: Posterior; Left  Pre-existing: Yes  Photo Taken: No   Wound Type Wound   Dressing Status Changed   Dressing/Treatment Alginate   Wound Cleansed Rinsed/Irrigated with saline   Dressing Change Due 08/28/18   Wound Length (cm) 5 cm   Wound Width (cm) 0.2 cm   Wound Depth (cm)  0.1   Calculated Wound Size (cm^2) (l*w) 1 cm^2   Change in Wound Size % (l*w) 93.33   Wound Assessment Red   Drainage Amount Scant   Drainage Description Serous   Odor None   Anastasiya-wound Assessment Dry   Wound 08/25/18 Ischium Left;Upper   Date First Assessed/Time First Assessed: 08/25/18 2312   Location: Ischium  Wound Location Orientation: Left;Upper  Pre-existing: Yes  Photo Taken: No   Wound Type Wound   Wound Pressure Stage  4   Dressing Status Changed   Dressing Changed Changed/New   Dressing/Treatment Alginate   Wound Cleansed Rinsed/Irrigated with saline   Dressing Change Due 08/28/18   Wound Length (cm) 3 cm   Wound Width (cm) 4.5 cm   Wound Depth (cm)  0.4   Calculated Wound Size (cm^2) (l*w) 13.5 cm^2   Change in Wound Size % (l*w) -188.46   Wound Assessment Red   Drainage Amount Small   Drainage Description Serosanguinous   Odor Mild   Wound 08/25/18 Ischium Left; Lower   Date First Assessed/Time First Assessed: 08/25/18 2312   Location: Ischium  Wound Location Orientation: Left; Lower  Pre-existing: Yes  Photo Taken: No   Wound Type Wound   Wound Pressure Stage  4   Dressing Status Changed   Dressing Changed Changed/New   Dressing/Treatment Alginate   Wound Cleansed Rinsed/Irrigated with saline   Dressing Change Due 08/28/18   Wound Length (cm) 7 cm   Wound Width (cm) 5 cm   Wound Depth (cm)  0.4   Calculated Wound Size (cm^2) (l*w) 35 cm^2   Change in Wound Size % (l*w) 2.78   Wound Assessment Red   Drainage Amount Moderate   Drainage Description Serosanguinous   Odor Mild   Anastasiya-wound Assessment Denuded       Impression:  Unstageable pressure injury on R. Heel and R. Ischium. Stage 4 pressure injuries on sacrum , L. Ischium (2 wounds), L. Posterior thigh healing wound. Colostomy, trach and s/p catheter. Interventions in place: All wounds cleansed with NSS. Applied opticell to R. Ischium, sacrum, L. Ischial and L. Posterior thigh. Covered with mepilex. R. Heel covered with eschar. Applied mepilex. Pt. Refused to have wounds photographed. Refused to have colostomy assessed stating \"It was just changed\" Dressing over trach site. S/P site changed using a drain sponge. Pt. Is now agreeable to a low air loss bed. Plan: Daily dressing changes using opticell, colostomy care as needed, SOS precautions including low air loss.        Diamond Albarran 8/27/2018 1:10 PM

## 2018-08-27 NOTE — PROGRESS NOTES
Occupational Therapy      Occupational Therapy attempt this pm.  Upon entering patient lying in bed - stated she just got back into bed and was having some pain - preferred therapy to try another time

## 2018-08-27 NOTE — PROGRESS NOTES
sacrum mixed GPO, Proteus  Wound to left posterior thigh Proteus, GNR, alpha Streptococcus M_SA  Wound left buttock mixed Proteus, non hemolytic Streptococcus  Urine culture >100K GNR, >100K GNR, 50K GNR  Blood cultures 8/25/18 negative so far    ASSESSMENT:  · Probable UTI with sepsis associated to suprapubic catheter  · Leukocytosis secondary to UTI  · History of Guillain-Barré syndrome, resulting in incomplete quadriplegia  · Multiple decubitus ulcers on ischia, coccyx, buttocks and right heel. Clinically not infected.  Colonized with multiple organisms    PLAN:  · Continue Zosyn alone for now  · Check cultures  · Monitor labs    Ambreen Haile  2:04 PM  8/27/2018

## 2018-08-27 NOTE — PLAN OF CARE
Problem: Pain:  Goal: Control of chronic pain  Control of chronic pain   Outcome: Met This Shift      Problem: Falls - Risk of:  Goal: Will remain free from falls  Will remain free from falls   Outcome: Met This Shift      Problem: Risk for Impaired Skin Integrity  Goal: Tissue integrity - skin and mucous membranes  Structural intactness and normal physiological function of skin and  mucous membranes.    Outcome: Not Met This Shift

## 2018-08-27 NOTE — PROGRESS NOTES
smear  6. Thrombocytosis - can be reactive from #5 and dehydration - improving   7. S/p trach and colostomy - open stoma site from old trach removed last September and has not followed with anyone She used pads to cover the surface at home. Will have general surgery evaluate her trach    NOTE: This report was transcribed using voice recognition software. Every effort was made to ensure accuracy; however, inadvertent computerized transcription errors may be present.     Electronically signed by MINDY Pardo CNP on 8/27/2018 at 4:14 PM

## 2018-08-27 NOTE — CONSULTS
ANNE (infected)    [place] PERCUT TRACH PUNCT/ASPIRATN  7/10/2014         THYMECTOMY      TRACHEOSTOMY      TUNNELED VENOUS CATHETER PLACEMENT         Medications Prior to Admission:    Prior to Admission medications    Medication Sig Start Date End Date Taking? Authorizing Provider   tamsulosin (FLOMAX) 0.4 MG capsule Take 0.4 mg by mouth nightly   Yes Historical Provider, MD   diphenhydrAMINE (BENADRYL) 25 MG capsule Take 25 mg by mouth every 4 hours as needed for Itching   Yes Historical Provider, MD   calcium carbonate (TUMS) 500 MG chewable tablet Take 1 tablet by mouth every 4 hours as needed for Heartburn   Yes Historical Provider, MD   hydrOXYzine (ATARAX) 50 MG tablet Take 50 mg by mouth nightly as needed (insomnia)   Yes Historical Provider, MD   Cranberry 450 MG CAPS Take by mouth daily LD 12/20/2017   Yes Historical Provider, MD   Menthol, Topical Analgesic, (BIOFREEZE EX) Apply topically 2 times daily as needed   Yes Historical Provider, MD   phenazopyridine (PYRIDIUM) 100 MG tablet Take 100 mg by mouth 3 times daily as needed for Pain   Yes Historical Provider, MD   gabapentin (NEURONTIN) 300 MG capsule Take 800 mg by mouth 3 times daily. Instructed to take morning of surgery with a sip of water . Yes Historical Provider, MD   ALPRAZolam Waylan Cain) 1 MG tablet Take 1 mg by mouth 3 times daily  Instructed to take morning of surgery with a sip of water. Yes Historical Provider, MD   oxyCODONE-acetaminophen (PERCOCET)  MG per tablet Take 1 tablet by mouth every 4 hours as needed for Pain .  6/6/17  Yes Christie Schaefer MD   loratadine (CLARITIN) 10 MG tablet Take 10 mg by mouth daily   Yes Historical Provider, MD   acetaminophen (TYLENOL) 325 MG tablet Take 650 mg by mouth every 4 hours as needed for Pain or Fever   Yes Historical Provider, MD   ondansetron (ZOFRAN) 4 MG tablet Take 4 mg by mouth every 4 hours as needed for Nausea or Vomiting   Yes Historical Provider, MD   vitamin D (ERGOCALCIFEROL) 35295 UNITS CAPS capsule Take 50,000 Units by mouth once a week Takes on Mondays   LD 12/18/2017   Yes Historical Provider, MD   famotidine (PEPCID) 10 MG tablet Take 10 mg by mouth daily Instructed to take morning of surgery with a sip of water   Yes Historical Provider, MD   metoprolol succinate (TOPROL XL) 25 MG extended release tablet Take 12.5 mg by mouth daily Instructed to take morning of surgery with a sip of water   Yes Historical Provider, MD   levETIRAcetam (KEPPRA) 1000 MG tablet Take 1,000 mg by mouth 2 times daily Instructed to take morning of surgery with a sip of water   Yes Historical Provider, MD   QUEtiapine (SEROQUEL) 200 MG tablet Take 200 mg by mouth nightly   Yes Historical Provider, MD   folic acid (FOLVITE) 1 MG tablet Take 1 mg by mouth daily   Yes Historical Provider, MD   venlafaxine (EFFEXOR) 75 MG tablet Take 75 mg by mouth 2 times daily Instructed to take morning of surgery with a sip of water 5/6/16  Yes Historical Provider, MD   melatonin 3 MG TABS tablet Take 3 mg by mouth nightly 8/6/16  Yes Historical Provider, MD   methadone (DOLOPHINE) 10 MG tablet Take 25 mg by mouth every 6 hours.  . 8/6/16  Yes Historical Provider, MD   midodrine (PROAMATINE) 5 MG tablet Take 5 mg by mouth daily Instructed to take morning of surgery with a sip of water 8/6/16  Yes Historical Provider, MD   fludrocortisone (FLORINEF) 0.1 MG tablet Take 0.1 mg by mouth every morning Instructed to take morning of surgery with a sip of water 8/6/16  Yes Historical Provider, MD   ferrous sulfate 325 (65 FE) MG tablet Take 1 tablet by mouth 2 times daily (with meals)  Patient taking differently: Take 325 mg by mouth 2 times daily  6/6/16  Yes [place],    Multiple Vitamins-Minerals (THERAPEUTIC MULTIVITAMIN-MINERALS) tablet Take 1 tablet by mouth daily 6/6/16  Yes [place] DO   senna (SENOKOT) 8.6 MG tablet Take 1 tablet by mouth 2 times daily 6/6/16  Yes Sourav Mtz, DO OF IMAGES:  1 view INDICATION: Cough COMPARISON: Chest x-ray May 23, 2017 FINDINGS:  Cardiac silhouette is not enlarged. Mediastinal contour is unremarkable. Lungs are clear. No pleural effusion or pneumothorax. Upper abdomen is unremarkable. Levoscoliosis of the thoracic spine and the chest cage deformity. 1. No acute pleuroparenchymal disease.          ASSESSMENT:  24 y.o. female with previous trach removal 10/2017    PLAN:  Stoma is partially healing and she is having no issues with it  If this continues to be an issue will possibly need ENT for flap closure on an out patient basis  No acute surgical needs  Discussed with Dr. Efraín Martin     Electronically signed by Jake Starks DO on 8/27/18 at 6:30 AM

## 2018-08-27 NOTE — PLAN OF CARE
Problem: Nutrition  Goal: Optimal nutrition therapy  Outcome: Ongoing  Nutrition Problem: Severe malnutrition, in context of chronic illness  Intervention: Food and/or Nutrient Delivery: Continue current diet (Continue Current Diet. Pt declines all ONS options at this time despite education.   She seemed most open to Protein Modular however declined this as well.  )  Nutritional Goals: PO intake >75% of meals

## 2018-08-27 NOTE — PROGRESS NOTES
Low air loss bed arrived. Attempted to get patient into low air loss bed. Patient refused. Explained to patient the benefits of having that type of bed and how it is better for her skin knowing that she already has multiple wounds. Patient refused stating \"not right now\". Bed left here to attempt to move patient into bed in the morning.

## 2018-08-27 NOTE — PROGRESS NOTES
Nutrition Assessment    Type and Reason for Visit: Initial, Positive Nutrition Screen, Consult (wt loss, poor abram, wounds)    Nutrition Recommendations: Continue Current Diet. Pt declines all ONS options at this time despite education. She seemed most open to Protein Modular however declined this as well. Malnutrition Assessment:  · Malnutrition Status: Meets the criteria for severe malnutrition  · Context: Chronic illness  · Findings of the 6 clinical characteristics of malnutrition (Minimum of 2 out of 6 clinical characteristics is required to make the diagnosis of moderate or severe Protein Calorie Malnutrition based on AND/ASPEN Guidelines):  1. Energy Intake-Less than or equal to 75%, greater than or equal to 1 month    2. Weight Loss-20% loss or greater,  (9 months)  3. Fat Loss-Severe subcutaneous fat loss, Orbital, Triceps  4. Muscle Loss-Severe muscle mass loss, Temples (temporalis muscle), Clavicles (pectoralis and deltoids), Scapula (trapezius)  5. Fluid Accumulation-Moderate to severe fluid accumulation, Extremities  6.  Strength-Not measured    Nutrition Diagnosis:   · Problem: Severe malnutrition, in context of chronic illness  · Etiology: related to Muscular dysfunction, Catabolic illness     Signs and symptoms:  as evidenced by Intake 50-75%, Diet history of poor intake, Presence of wounds, BMI, Weight loss, Severe loss of subcutaneous fat, Severe muscle loss, Localized or generalized fluid accumulation    Nutrition Assessment:  · Subjective Assessment: No family present. Despite education and pursuasion, pt declines all ONS options at this time d/t not liking the taste. She states she's eating ~50% of meals at this time. States unsure on amount of wt loss or UBW at this time as well.   · Nutrition-Focused Physical Findings: A&O, +teeth, abd WDL, +BS, +colostomy, +2/+3 BLE edema, -I/O's  · Wound Type: Multiple, Pressure Ulcer, Stage IV, Unstageable, Open Wounds  · Current Nutrition

## 2018-08-27 NOTE — PROGRESS NOTES
Physical Therapy    Facility/Department: [address] MED SURG/TELE  Initial Assessment    NAME: Nury Cohn  : 1995  MRN: [id number]    Date of Service: 2018    Evaluating Therapist: Orlin Marvin. Julia Martinse, P.T. 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 PRECAUTIONS: FALLS    Social:  Pt is homeless at this time. She has been bouncing around from friend house to friend house per pt. Prior to admission pt used electric WC for mobility. Pt has manual high back WC and hospital bed at home. Pt states her friend just lifts her up to transfer her to/from [facility]     Initial Evaluation  Date: 18 Treatment      Short Term/ Long Term   Goals   Was pt agreeable to Eval/treatment? yes     Does pt have pain? No c/o pain     Bed Mobility  Rolling: MOD A  Supine to sit: MAX A  Sit to supine: NA  Scooting: MAX A  MIN A   Transfers Sit to stand: Dependent  Stand to sit: dependent  Stand pivot: dependent  Dependent   Electric WC NA, it is not here. I with electric WC   Stair negotiation: ascended and descended NA  NA   AM-PAC Raw Score         Pt is alert and Oriented x3  BLE PROM is WFL. BLE strength is grossly 0/5. Balance: sitting EOB MAX A  Edema: B feet  Endurance: fair       ASSESSMENT  Pt displays functional ability as noted in the objective portion of this evaluation. Comments/Treatment:  Pt was agreeable to getting OOB to her high back manual WC that was present with a cushion in place. Pt sat EOB and attempted to use BUE to assist with balance. She sat EOB x 3 min working on her balance and then was dependently transferred to her WC. Pt was positioned in her WC to comfort. Pt was left sitting up in high back WC with it reclined back to comfort with call light in reach.      Patient education  Pt educated on sitting balance     Patient response to

## 2018-08-28 NOTE — PROGRESS NOTES
Bed Mobility  Dependent for rolling Pt is able to hold self to side against bed rail upon being rolled   Functional Transfers NT pt declines however baseline is dependent     Functional Mobility NT      Sit balance: NT pt declines OOB activity however pt reports her baseline as being dependent and requiring assist to maintain upright unsupported sitting  Endurance: poor. Pt reports wanting to stay in bed currently due to \"not feeling well\". Pt reports she typically stays up in her chair most of the day at home. Assessment of current deficits    Functional mobility []  ROM [] Strength []  Cognition []  ADLs [x]   IADLs [] Safety Awareness [] Endurance []  Fine Motor Coordination [] Balance [] Vision/perception [] Sensation []   Gross Motor Coordination []     Eval Complexity: low  Profile and History- brief  Assessment of Occupational Performance and Identification of Deficits- currently reports being homeless, extensive assist from caregivers at [address]- low    Plan of Care:   ADL retraining []   Equipment needs []   Neuromuscular re-education [] Energy Conservation Techniques []  Functional Transfer training [] Patient and/or Family Education []  Functional Mobility training []  Environmental Modifications []  Cognitive re-training []   Compensatory techniques for ADLs []  Splinting Needs []                             Therapeutic Activities []  Positioning/joint protection []             Therapeutic Strengthening  []      Other: []        Comments: Pt reports she is at baseline for her functional status at this time aside from not wanting to sit up in her chair. OT educates patient on purpose of OT services. Pt reports she has been through therapy and knows how to do all of her compensatory strategies to manage with her tenodesis  during her daily routine. Pt reports no further need for OT services at this time.  OT to discontinue OT order per pt request. OT

## 2018-08-28 NOTE — PATIENT CARE CONFERENCE
P Quality Flow/Interdisciplinary Rounds Progress Note        Quality Flow Rounds held on August 28, 2018    Disciplines Attending:  Bedside Nurse, ,  and Nursing Unit Leadership    Nirmala Ojead was admitted on 8/25/2018 12:33 PM    Anticipated Discharge Date:       Disposition:    Aj Score:  Aj Scale Score: 14    Readmission Score:         Discussed patient goal for the day, patient clinical progression, and barriers to discharge.   The following Goal(s) of the Day/Commitment(s) have been identified:  IV atb, paliiative consult for pain management      [facility]  August 28, 2018

## 2018-08-28 NOTE — PROGRESS NOTES
[address] [place] [address] [facility]  [facility]  Progress Note    SUBJECTIVE:  Chief Complaint   Patient presents with    Wound Check     reports paralyzed and worried about wound infections on coccyx, legs, and feet    Other     reports not taking nay of her medications, including methadone for last 2 days. Having trouble with sanches leaking     Patient is tolerating medications. No reported adverse drug reactions. No nausea, vomiting, diarrhea. Doing better. No new complaints today. Review of systems:  As stated above in the chief complaint, otherwise negative. Medications:  Scheduled Meds:   piperacillin-tazobactam  3.375 g Intravenous Q8H    sodium chloride flush  10 mL Intravenous 2 times per day    ferrous sulfate  325 mg Oral BID WC    folic acid  1 mg Oral Daily    levETIRAcetam  1,000 mg Oral BID    melatonin  3 mg Oral Nightly    therapeutic multivitamin-minerals  1 tablet Oral Daily    venlafaxine  75 mg Oral BID    sodium chloride flush  10 mL Intravenous 2 times per day    enoxaparin  40 mg Subcutaneous Daily    famotidine  20 mg Oral BID    nicotine  1 patch Transdermal Daily    gabapentin  800 mg Oral TID     Continuous Infusions:   sodium chloride 75 mL/hr at 18 0918    sodium chloride 500 mL (18 1616)     PRN Meds:ALPRAZolam, sodium chloride flush, acetaminophen, morphine, acetaminophen, hydrOXYzine, ondansetron, oxyCODONE-acetaminophen, sodium chloride flush, magnesium hydroxide, ondansetron, naloxone    OBJECTIVE:  BP (!) 128/93   Pulse 97   Temp 98.2 °F (36.8 °C)   Resp 18   Ht 5' 7\" (1.702 m)   Wt 98 lb 14.4 oz (44.9 kg)   SpO2 97%   BMI 15.49 kg/m²   Temp  Av.9 °F (36.6 °C)  Min: 97.5 °F (36.4 °C)  Max: 98.2 °F (36.8 °C)  Constitutional: The patient is awake, alert, and oriented. Paraplegic. She is able to move upper extremities but has multiple contractures. Depressed affect todayn  Skin: Warm and dry. No rashes were noted.  Multiple wounds GPO  Wound sacrum mixed GPO, Proteus  Wound to left posterior thigh Proteus, GNR, alpha Streptococcus M_SA  Wound left buttock mixed Proteus, non hemolytic Streptococcus  Urine culture >100K Providencia stuartii, 50K Proteus mirabilis, >100K E. coli  Blood cultures 8/25/18 negative so far    ASSESSMENT:  · Probable UTI with sepsis associated to suprapubic catheter  · Leukocytosis secondary to UTI  · History of Guillain-Barré syndrome, resulting in incomplete quadriplegia  · Multiple decubitus ulcers on ischia, coccyx, buttocks and right heel. Clinically not infected.  Colonized with multiple organisms    PLAN:  · Stop Zosyn alone for now  · Start Augmentin and Cefdinir  · No need to treat organisms colonizing wounds  · Wound care    Mendoza Molina  2:50 PM  8/28/2018

## 2018-08-28 NOTE — CONSULTS
the following: N/A- Palliative Care Patient    Time/Communication  Greater than 51% of time spent, total 50 minutes in counseling and coordination of care at the bedside regarding symptom management. Discussed patient and the plan of care with the other IDT members of Palliative Care, Dr. Adarsh Redd, Dr. Sheeba Pearce and Hospice Team, and with Primary Attending and patient. Subjective:     HPI:  Eliel Graves is a [employer] y.o. female with significant past medical history of neuromuscular dysfunction with paralysis (Guillain-[place] 2014), hep C, history of substance abuse. decubitus ulcers, anxiety, personality disorder who presented to the ED with complaints of trouble with her sanches, need for wound checks. She was admitted with the diagnoses of acute cystitis with hematuria and sacral wound. She has been started on cefepime with added vancomycin. Cultures from suprapubic catheter/urine and wound have been sent. She was hypotensive and given fluids that helped improve her BP as well as restarting her midodrine. Additionally, dietary was consulted due to severe protein calorie malnutrition. ID consulted as well. We are consulted to discuss symptoms with the patient, primarily her pain. Ira Flowers reports she has been treated for all over pain and extremity pain for an extended time on methadone. She reports her most recent dose was 25 mg every 6 hours. She resided in a [address] [place] [address] Drive until May/Raquel of this year and prescribed by Dr. Rosa Rayo (all prescriptions on [facility] from 7/2017-6/14/18 from Dr Rosa Rayo). The most recent prescription she received was Dr Rock Holland vicodin 5/325mg #10 tabs on 8/1/18. She reports the last methadone she received from a doctor was during admission to [facility]. She had an old methadone prescription at home that she has been taking before coming to the hospital.  She reports her last dose was the day before she came to the hospital 8/25.   Her urine drug screen completed 8/27 was positive for opiates only explained by ordered pain medications:morphine 1 mg IV and oxycodone 10/325mg. Adjunct medications include effexor, baclofen, and neurontin. Patient reports a history of heroin use starting at age 14. She used IV and up to 2 grams daily. She reports no use for 7 years. She stopped using heroin and started taking subutex when she found out she was pregnant. Her child has lived with a family friend since birth.       Past Medical History:   Diagnosis Date    Anxiety     Blunt force injury     Chronic pain     Chronic respiratory failure ([facility]     s/p tracheostomy / was removed 9/2017, stoma remains    Depression     Guillain Barré syndrome (HCC)     Hepatitis C reactive     History of drug abuse     Iron deficiency anemia     baseline hemoglobin 8-10    Local infection due to [place] catheter     Myasthenia gravis ([facility]     Neurologic disorder     Guillain-[place] Syndrome variant    Neuromuscular dysfunction of bladder     has a supra pubic catheter    Paraplegia (HCC)     due to traumatic spinal cord injury    Personality disorder     Pressure sore     bilateral buttocks    Seizures (HCC)     stable    UTI (urinary tract infection) 5/17/2017       Past Surgical History:   Procedure Laterality Date    COLOSTOMY  10/04/2016    open diverting    GASTROSTOMY TUBE PLACEMENT  07/17/2014    and removed    OTHER SURGICAL HISTORY  12/30/15    Excision debridement of sacral and buttock wounds     OTHER SURGICAL HISTORY Bilateral 04/25/2016    I7D Bilateral sacral wounds    OTHER SURGICAL HISTORY  05/20/2017    triple lumen catheter insertion/ tesio removal    OTHER SURGICAL HISTORY  12/22/2017    REMOVAL OF MEDIPORT (infected)    NM PERCUT TRACH PUNCT/ASPIRATN  7/10/2014         THYMECTOMY      TRACHEOSTOMY      TUNNELED VENOUS CATHETER PLACEMENT         Family History   Problem Relation Age of Onset    Cancer Mother         esophageal    Cancer

## 2018-08-28 NOTE — PROGRESS NOTES
carotid bruits  Abdomen: soft, non-tender, non-distended, normal bowel sounds, no masses or organomegaly  Extremities: no cyanosis, no clubbing and 1-2+ BLE edema  Neurologic: no cranial nerve deficit and speech normal, inability to walk d/t paraplegia and ? MS        Recent Labs      08/26/18   0915   NA  138   K  4.6   CL  103   CO2  24   BUN  17   CREATININE  0.3*   GLUCOSE  113*   CALCIUM  9.0       Recent Labs      08/26/18   0915  08/27/18   0900  08/28/18   1005   WBC  10.4  8.0  9.0   RBC  4.21  4.17  3.82   HGB  10.8*  10.7*  9.6*   HCT  36.5  36.8  33.3*   MCV  86.7  88.2  87.2   MCH  25.7*  25.7*  25.1*   MCHC  29.6*  29.1*  28.8*   RDW  17.8*  17.6*  17.9*   PLT  469*  373  399   MPV  9.0  9.0  8.9         Assessment:    Active Problems:    Severe protein-calorie malnutrition ([address]    Acute cystitis with hematuria    Pain management  Resolved Problems:    * No resolved hospital problems. *      Plan:  1. Sepsis in setting of  UTI being treated with IV antibiotics -  tachycardia -  leukocytosis resolved -  ID on   2. Multiple wounds on buttocks and hips -  Positive cultures of wound (See above) wound care nurse following - speciality bed -  Morphine and Percocet prn for pain - palliative for Methadone Rx - no Methadone given now or in the future  3. Hypotension due to dehydration - currently resolved    4. Severe Protein Calorie Malnutrition -appetite  slowly improving   5. Normocytic Anemia - check iron studies and peripheral smear  6. Thrombocytosis - most likely was reactive from #5 and dehydration   7. S/p trach and colostomy - open stoma site from old trach removed last September and has not followed with anyone She used pads to cover the surface at home. Will have general surgery evaluate her trach    NOTE: This report was transcribed using voice recognition software. Every effort was made to ensure accuracy; however, inadvertent computerized transcription errors may be present.     Electronically

## 2018-08-29 NOTE — PROGRESS NOTES
ALT 9 08/25/2018     Radiology:      Microbiology:   Wound buttocks mixed GNR, GNR  Wound left hip mixed GNR, Proteus, mixed GPO  Wound sacrum mixed GPO, Proteus  Wound to left posterior thigh Proteus, GNR, alpha Streptococcus M_SA  Wound left buttock mixed Proteus, non hemolytic Streptococcus  Urine culture >100K Providencia stuartii, 50K Proteus mirabilis, >100K E. coli  Blood cultures 8/25/18 negative so far    ASSESSMENT:  · Probable UTI with sepsis associated to suprapubic catheter  · Leukocytosis secondary to UTI  · History of Guillain-Barré syndrome, resulting in incomplete quadriplegia  · Multiple decubitus ulcers on ischia, coccyx, buttocks and right heel. Clinically not infected. Colonized with multiple organisms    PLAN:  · Continue Augmentin and Cefdinir.  Change to pill form  · No need to treat organisms colonizing wounds  · Wound care  · Probable discharge tomorrow    Maggy Bullard  1:54 PM  8/29/2018

## 2018-08-29 NOTE — PROGRESS NOTES
Pt refuses for me to change wound dressings. She states \"its not like it helps and they do it every other day at home\". Educated pt on importance of wound care. States she doesn't feel good.

## 2018-08-29 NOTE — PATIENT CARE CONFERENCE
P Quality Flow/Interdisciplinary Rounds Progress Note        Quality Flow Rounds held on August 29, 2018    Disciplines Attending:  Bedside Nurse, ,  and Nursing Unit Leadership    Bernard Wilkins was admitted on 8/25/2018 12:33 PM    Anticipated Discharge Date:       Disposition:    Aj Score:  Aj Scale Score: 14    Readmission Risk              Risk of Unplanned Readmission:        24             Discussed patient goal for the day, patient clinical progression, and barriers to discharge.   The following Goal(s) of the Day/Commitment(s) have been identified:  Palliative consult monitor HR Disposition       Berton Route  August 29, 2018

## 2018-08-29 NOTE — PROGRESS NOTES
[facility] Progress Note    Admitting Date and Time: 8/25/2018 12:33 PM  Admit Dx: Acute cystitis with hematuria [N30.01]  Acute cystitis with hematuria [N30.01]    Subjective:    Pt seen while sitting up in bed with complaints of not feeling well. Patient was not able to tell me just what/how she did not feel well/what is bothering her. Patient denies any complaints of shortness of breath or chest pain. ROS: denies fever, chills, cp, sob, n/v, HA unless stated above.       amoxicillin-clavulanate  800 mg Oral 2 times per day    cefdinir  300 mg Oral Daily    midodrine  5 mg Oral Daily    metoprolol succinate  12.5 mg Oral Daily    sodium chloride flush  10 mL Intravenous 2 times per day    ferrous sulfate  325 mg Oral BID WC    folic acid  1 mg Oral Daily    levETIRAcetam  1,000 mg Oral BID    melatonin  3 mg Oral Nightly    therapeutic multivitamin-minerals  1 tablet Oral Daily    venlafaxine  75 mg Oral BID    sodium chloride flush  10 mL Intravenous 2 times per day    enoxaparin  40 mg Subcutaneous Daily    famotidine  20 mg Oral BID    nicotine  1 patch Transdermal Daily    gabapentin  800 mg Oral TID       ALPRAZolam 1 mg TID PRN   sodium chloride flush 10 mL PRN   acetaminophen 650 mg Q4H PRN   acetaminophen 650 mg Q4H PRN   hydrOXYzine 50 mg Nightly PRN   ondansetron 4 mg Q4H PRN   oxyCODONE-acetaminophen 1 tablet Q4H PRN   sodium chloride flush 10 mL PRN   magnesium hydroxide 30 mL Daily PRN   ondansetron 4 mg Q6H PRN   naloxone 0.4 mg PRN        Objective:    /75   Pulse 99   Temp 97.8 °F (36.6 °C) (Oral)   Resp 22   Ht 5' 7\" (1.702 m)   Wt 98 lb 14.4 oz (44.9 kg)   SpO2 98%   BMI 15.49 kg/m²     General Appearance: alert and oriented to person, place and time and in no acute distress  Skin: warm and dry  Head: normocephalic and atraumatic  Eyes: pupils equal, round, and reactive to light, extraocular eye movements intact, conjunctivae normal  Neck: neck

## 2018-08-30 NOTE — PROGRESS NOTES
Radiology:      Microbiology:   Wound buttocks mixed GNR, GNR  Wound left hip mixed GNR, Proteus, mixed GPO  Wound sacrum mixed GPO, Proteus  Wound to left posterior thigh Proteus, GNR, alpha Streptococcus M_SA  Wound left buttock mixed Proteus, non hemolytic Streptococcus  Urine culture >100K Providencia stuartii, 50K Proteus mirabilis, >100K E. Coli, 50K VREnterococcus faecium  Blood cultures 8/25/18 negative so far    ASSESSMENT:  · Probable UTI with sepsis associated to suprapubic catheter  · Leukocytosis secondary to UTI  · History of Guillain-Barré syndrome, resulting in incomplete quadriplegia  · Multiple decubitus ulcers on ischia, coccyx, buttocks and right heel. Clinically not infected.  Colonized with multiple organisms    PLAN:  · Continue Augmentin and Cefdinir until 9/6/18  · No need to treat organisms colonizing wounds such as VRE  · Wound care  · Contact isolation  · Patient can be discharged from [facility] 310  1:05 PM  8/30/2018

## 2018-08-30 NOTE — PROGRESS NOTES
methadone she received from a doctor was during admission to [facility]. She had an old methadone prescription at home that she has been taking before coming to the hospital.  She reports her last dose was the day before she came to the hospital 8/25. Her urine drug screen completed 8/27 was positive for opiates only explained by ordered pain medications:morphine 1 mg IV and oxycodone 10/325mg. Adjunct medications include effexor, baclofen, and neurontin. Patient reports a history of heroin use starting at age 14. She used IV and up to 2 grams daily. She reports no use for 7 years. She stopped using heroin and started taking subutex when she found out she was pregnant. Her child has lived with a family friend since birth. Allergies   Allergen Reactions    Cefepime Itching       Objective:   PHYSICAL EXAM:   Vitals:    /87   Pulse 102   Temp 97.7 °F (36.5 °C) (Oral)   Resp 16   Ht 5' 7\" (1.702 m)   Wt 98 lb 14.4 oz (44.9 kg)   SpO2 98%   BMI 15.49 kg/m²   Gen: thin, NAD, patient appears slightly sleepy this am   HEENT: normocephalic, atraumatic, PERRL, EOMI, sclera anicteric, conjunctiva pink and moist  Neck: trach with gauze covering  Lungs: lungs CTA bilaterally  Heart: tachycardic, distant heart tones, no murmurs/rubs/gallops appreciated   Abdomen: normoactive bowel sounds, soft, non-tender, non-distended, colostomy/suprapubic tube   Extremities: contracted, legs with hyperpigmentation and generalized edema bilaterally  Skin: reported decubitus ulcer per wound care  Neuro: alert when entered room but slightly sleeping while starting to talk    Current Medications:  Inpatient medications reviewed: yes  Home Medications reviewed: yes    Results/Verification of Data Review  Objective data reviewed: labs, images, records, medication use, vitals and chart    Data in Support of Terminal Illness:N/A Palliative Patient.       Мария Hernandez PA-C  Palliative Medicine    Thank you for allowing Palliative

## 2018-08-30 NOTE — CARE COORDINATION
Informed by [place] that a facility has accepted [facility]. Met with [facility] and informed her  That [facility] has accepted her. She asked if she could smoke and if it is a private room. The room is private. Informed her that she is in contact isolation here because her urine is positive for VRE.

## 2018-08-30 NOTE — PROGRESS NOTES
movement, no respiratory distress  Cardiovascular: normal rate, normal S1 and S2 and no carotid bruits  Abdomen: soft, non-tender, non-distended, normal bowel sounds, no masses or organomegaly  Extremities: no cyanosis, no clubbing and 1-2+ BLE edema, with contractures   Neurologic: no cranial nerve deficit and speech normal, abnormal gait d/t paraplegic       No results for input(s): NA, K, CL, CO2, BUN, CREATININE, GLUCOSE, CALCIUM in the last 72 hours. Recent Labs      08/28/18   1005   WBC  9.0   RBC  3.82   HGB  9.6*   HCT  33.3*   MCV  87.2   MCH  25.1*   MCHC  28.8*   RDW  17.9*   PLT  399   MPV  8.9     Updated: 08/29/18 0721    Specimen Source: Urine, clean catch     Urine Culture, Routine Physician requested workup (A)    Organism Escherichia coli (A)    Urine Culture, Routine >100,000 CFU/ml    Organism Providencia stuartii (A)    Urine Culture, Routine >100,000 CFU/ml    Organism Proteus mirabilis (A)    Urine Culture, Routine 50,000 CFU/ml    Organism Enterococcus faecium (A)    Urine Culture, Routine --    75,000 CFU/ml   Vancomycin resistant Enterococci isolated    Narrative:     Source: URINE       Site: Urine Suprapubic Cath                 Assessment:    Active Problems:    Severe protein-calorie malnutrition (HCC)    Acute cystitis with hematuria    Pain management  Resolved Problems:    * No resolved hospital problems. *      Plan:  1. Sepsis in setting of  UTI was being treated with IV antibiotics -  tachycardia -  leukocytosis       resolved -  ID on - switched to augmentin and cefdinir  2. Multiple wounds on buttocks and hips -  Positive cultures of wound (See above) wound care nurse following - speciality bed -  Percocet prn for pain - no Methadone given now or in the future - patient was instructed to go to pain clinic or Methadone clinic once discharge   3. Hypotension due to dehydration - currently resolved    4. Severe Protein Calorie Malnutrition - appetite slowly improving   5. Normocytic Anemia - check iron studies and peripheral smear - stable no signs of active bleeding noted   6. Thrombocytosis - most likely was reactive from #5 and dehydration   7. S/p trach and colostomy - open stoma site from old trach removed last September and has not followed with anyone - She used 2x2 pads to cover the surface at home - will have general surgery evaluate her trach site for closure - colostomy functioning well  8. Methadone use unfortunately due to the discrepancy in her story about dose of methadone.  I'm not comfortable restarting the patient on methadone, patient tested negative for methadone on admission and she claimed to have taken methadone last the day before admission, she also reported a different dose of methadone from the last 30 script she had and [facility], I discussed with palliative, we will not order methadone for her. Stop morphine  9. Dispo, no definite dispo yet, the patient burned her bridges with a lot of facilities, even her friend that she claimed would take her, is not going to take her anymore    NOTE: This report was transcribed using voice recognition software. Every effort was made to ensure accuracy; however, inadvertent computerized transcription errors may be present.     Electronically signed by Nico Sullivan MD on 8/30/2018 at 12:30 PM

## 2018-08-31 NOTE — CARE COORDINATION
Social Work ./ discharge planning    8/31/2018  12:29 PM    Patient is refusing to go to [facility] and wants to go to [address]  Referral made to Angelia Cole with [address] and they have accepted patient. Canceled referral to [facility]. PASS and Level completed and sent to [facility] agency on Aging. Patient \"tripped screen\" which means will need to wait for agency to do an onsite visit to determine if patient is able to go to facility.      Electronically signed by NEFTALY Blood on 8/31/2018 at 12:36 PM

## 2018-08-31 NOTE — PROGRESS NOTES
respiratory distress  Cardiovascular: normal rate, normal S1 and S2 and no carotid bruits  Abdomen: soft, non-tender, non-distended, normal bowel sounds, no masses or organomegaly  Extremities: no cyanosis, no clubbing and 1-2+ BLE edema, with contractures   Neurologic: no cranial nerve deficit and speech normal, abnormal gait d/t paraplegic       No results for input(s): NA, K, CL, CO2, BUN, CREATININE, GLUCOSE, CALCIUM in the last 72 hours. No results for input(s): WBC, RBC, HGB, HCT, MCV, MCH, MCHC, RDW, PLT, MPV in the last 72 hours. Updated: 08/29/18 0721    Specimen Source: Urine, clean catch     Urine Culture, Routine Physician requested workup (A)    Organism Escherichia coli (A)    Urine Culture, Routine >100,000 CFU/ml    Organism Providencia stuartii (A)    Urine Culture, Routine >100,000 CFU/ml    Organism Proteus mirabilis (A)    Urine Culture, Routine 50,000 CFU/ml    Organism Enterococcus faecium (A)    Urine Culture, Routine --    75,000 CFU/ml   Vancomycin resistant Enterococci isolated    Narrative:     Source: URINE       Site: Urine Suprapubic Cath                 Assessment:    Active Problems:    Severe protein-calorie malnutrition (HCC)    Acute cystitis with hematuria    Pain management  Resolved Problems:    * No resolved hospital problems. *      Plan:  1. Sepsis in setting of  UTI was being treated with IV antibiotics -  tachycardia -  leukocytosis       resolved -  ID on - switched to augmentin and cefdinir  2. Multiple wounds on buttocks and hips -  Positive cultures of wound (See above) wound care nurse following - speciality bed -  Percocet prn for pain - no Methadone given now or in the future - patient was instructed to go to pain clinic or Methadone clinic once discharge   3. Hypotension due to dehydration - currently resolved    4. Severe Protein Calorie Malnutrition - appetite slowly improving   5.   Normocytic Anemia - check iron studies and peripheral smear - stable no signs of active bleeding noted   6. Thrombocytosis - most likely was reactive from #5 and dehydration   7. S/p trach and colostomy - open stoma site from old trach removed last September and has not followed with anyone - She used 2x2 pads to cover the surface at home - will have general surgery evaluate her trach site for closure - colostomy functioning well  8. Methadone use unfortunately due to the discrepancy in her story about dose of methadone.  I'm not comfortable restarting the patient on methadone, patient tested negative for methadone on admission and she claimed to have taken methadone last the day before admission, she also reported a different dose of methadone from the last 30 script she had and the last dose she was given at [facility], I discussed with palliative, we will not order methadone for her. Stop morphine  9. Dispo, no definite dispo yet, the patient burned her bridges with a lot of facilities, even her friend that she claimed would take her, is not going to take her anymore. The state will need to evaluate the patient which will happen next [email or web address]    NOTE: This report was transcribed using voice recognition software. Every effort was made to ensure accuracy; however, inadvertent computerized transcription errors may be present.     Electronically signed by Raiza Petit MD on 8/31/2018 at 1:02 PM

## 2018-08-31 NOTE — PROGRESS NOTES
Called Dr. Woody Zhao regarding patient's tachycardia and vitals; Dr. Woody Zhao said he would put in orders to address patient's condition

## 2018-08-31 NOTE — PROGRESS NOTES
extremities but has multiple contractures. Skin: Warm and dry. No rashes were noted. Multiple wounds Not seen today. HEENT: Eyes show round, and reactive pupils. No jaundice. Moist mucous membranes, no ulcerations, no thrush. Suprasternal ostomy from previous tracheostomy covered. She is able to speak by covering the ostomy. Neck: Supple to movements. Chest: No wheezing, crackles, or rhonchi. Cardiovascular: S1 and S2 are rhythmic and regular. No murmurs appreciated. Abdomen: Positive bowel sounds to auscultation. Benign to palpation. Suprapubic catheter with clear urine. Extremities: Some edema and lymphedema noted in the left leg. Edema over the right foot.   Lines: peripheral    Laboratory and Tests Review:  Lab Results   Component Value Date    WBC 9.0 08/28/2018    WBC 8.0 08/27/2018    WBC 10.4 08/26/2018    HGB 9.6 (L) 08/28/2018    HCT 33.3 (L) 08/28/2018    MCV 87.2 08/28/2018     08/28/2018     Lab Results   Component Value Date    NEUTROABS 5.14 08/28/2018    NEUTROABS 4.32 08/27/2018    NEUTROABS 7.47 (H) 08/26/2018     Lab Results   Component Value Date    CRP 6.0 (H) 08/25/2018    CRP 0.3 06/02/2017    CRP 4.4 (H) 05/20/2017     No results found for: [facility]  Lab Results   Component Value Date    SEDRATE 118 (H) 08/26/2018    SEDRATE 66 (H) 06/02/2017    SEDRATE 106 (H) 05/20/2017     Lab Results   Component Value Date    ALT 9 08/25/2018    AST 12 08/25/2018    ALKPHOS 68 08/25/2018    BILITOT 0.3 08/25/2018     Lab Results   Component Value Date     08/26/2018    K 4.6 08/26/2018     08/26/2018    CO2 24 08/26/2018    BUN 17 08/26/2018    CREATININE 0.3 08/26/2018    CREATININE 0.3 08/25/2018    CREATININE 0.3 11/10/2017    GFRAA >60 08/26/2018    LABGLOM >60 08/26/2018    GLUCOSE 113 08/26/2018    PROT 8.1 08/25/2018    LABALBU 2.8 08/25/2018    CALCIUM 9.0 08/26/2018    BILITOT 0.3 08/25/2018    ALKPHOS 68 08/25/2018    AST 12 08/25/2018    ALT 9 08/25/2018 Radiology:      Microbiology:   Wound buttocks mixed GNR, GNR  Wound left hip mixed GNR, Proteus, mixed GPO  Wound sacrum mixed GPO, Proteus  Wound to left posterior thigh Proteus, GNR, alpha Streptococcus M_SA  Wound left buttock mixed Proteus, non hemolytic Streptococcus  Urine culture >100K Providencia stuartii, 50K Proteus mirabilis, >100K E. Coli, 50K VREnterococcus faecium  Blood cultures 8/25/18 negative so far    ASSESSMENT:  · Probable UTI with sepsis associated to suprapubic catheter  · Leukocytosis secondary to UTI  · History of Guillain-Barré syndrome, resulting in incomplete quadriplegia  · Multiple decubitus ulcers on ischia, coccyx, buttocks and right heel. Clinically not infected. Colonized with multiple organisms    PLAN:  · Continue Augmentin and Cefdinir until 9/6/18.  Reconciled  · No need to treat organisms colonizing wounds such as VRE  · Wound care  · Contact isolation  · Patient can be discharged from [place] [id number]  10:19 AM  8/31/2018

## 2018-08-31 NOTE — PATIENT CARE CONFERENCE
IV team consult for site rotation---Refusing IV site rotation with no ultrasound guidance. Asking for \"Juana ONLY\". Advised patient that Álvaro Schroeder NOT working today. Will attempt to re-assess tonight or first thing in the morning. Current IV site symptom free, no redness or swelling at site.

## 2018-09-01 NOTE — PROGRESS NOTES
Pt complaining her new IV site that Mando from IV team put in was causing her extreme pain in her arm. I tried flushing it and it wouldn't flush there was no blood return or anything so I pulled it.  I will re-consult IV team

## 2018-09-01 NOTE — PROGRESS NOTES
Called Dr. Jorge Bernal regarding patient's low bp and giving midodrine now; Dr. Jorge Bernal said ok to give midodrine now

## 2018-09-01 NOTE — PROGRESS NOTES
Pt wanted her one time dose of morphine to get her dressings changed. Her BP was low so I called Dr Jareth Ramirez to check if it was Jd Patel to give he said no to discontinue it because it was an order from the day before when she refused it. So I discontinued it and she didntwant her dressings changed without it.

## 2018-09-01 NOTE — PROGRESS NOTES
Called Dr. Cecy Jackson regarding patient's vitals and hypotension; Dr. Cecy Jackson said to retake vitals soon and monitor patient

## 2018-09-01 NOTE — PLAN OF CARE
Problem: Pain:  Goal: Control of chronic pain  Control of chronic pain   Outcome: Not Met This Shift      Problem: Falls - Risk of:  Goal: Will remain free from falls  Will remain free from falls   Outcome: Met This Shift      Problem: Risk for Impaired Skin Integrity  Goal: Tissue integrity - skin and mucous membranes  Structural intactness and normal physiological function of skin and  mucous membranes.    Outcome: Not Met This Shift

## 2018-09-01 NOTE — PROGRESS NOTES
non tender without mass   Pulmonary/Chest: diminished mainly at the bases but clear to auscultation bilaterally- no wheezes, rales or rhonchi, normal air movement, no respiratory distress  Cardiovascular: normal rate, normal S1 and S2 and no carotid bruits  Abdomen: soft, non-tender, non-distended, normal bowel sounds, no masses or organomegaly  Extremities: no cyanosis, no clubbing and 1-2+ BLE edema, with contractures   Neurologic: no cranial nerve deficit and speech normal, abnormal gait d/t paraplegic       No results for input(s): NA, K, CL, CO2, BUN, CREATININE, GLUCOSE, CALCIUM in the last 72 hours. No results for input(s): WBC, RBC, HGB, HCT, MCV, MCH, MCHC, RDW, PLT, MPV in the last 72 hours. Updated: 08/29/18 0721    Specimen Source: Urine, clean catch     Urine Culture, Routine Physician requested workup (A)    Organism Escherichia coli (A)    Urine Culture, Routine >100,000 CFU/ml    Organism Providencia stuartii (A)    Urine Culture, Routine >100,000 CFU/ml    Organism Proteus mirabilis (A)    Urine Culture, Routine 50,000 CFU/ml    Organism Enterococcus faecium (A)    Urine Culture, Routine --    75,000 CFU/ml   Vancomycin resistant Enterococci isolated    Narrative:     Source: URINE       Site: Urine Suprapubic Cath                 Assessment:    Active Problems:    Severe protein-calorie malnutrition (HCC)    Acute cystitis with hematuria    Pain management  Resolved Problems:    * No resolved hospital problems. *      Plan:  1. Sepsis in setting of  UTI was being treated with IV antibiotics -  tachycardia -  leukocytosis       resolved -  ID on - switched to augmentin and cefdinir  2. Multiple wounds on buttocks and hips -  Positive cultures of wound (See above) wound care nurse following - speciality bed -  Percocet prn for pain - no Methadone given now or in the future - patient was instructed to go to pain clinic or Methadone clinic once discharge   3.   Hypotension due to dehydration - currently resolved    4. Severe Protein Calorie Malnutrition - appetite slowly improving   5. Normocytic Anemia - check iron studies and peripheral smear - stable no signs of active bleeding noted   6. Thrombocytosis - most likely was reactive from #5 and dehydration   7. S/p trach and colostomy - open stoma site from old trach removed last September and has not followed with anyone - She used 2x2 pads to cover the surface at home - will have general surgery evaluate her trach site for closure - colostomy functioning well  8. Methadone use unfortunately due to the discrepancy in her story about dose of methadone.  I'm not comfortable restarting the patient on methadone, patient tested negative for methadone on admission and she claimed to have taken methadone last the day before admission, she also reported a different dose of methadone from the last 30 script she had and the last dose she was given at [facility] and [facility], I discussed with palliative, we will not order methadone for her. Stop morphine  9. Dispo, no definite dispo yet, the patient burned her bridges with a lot of facilities, even her friend that she claimed would take her, is not going to take her anymore. The state will need to evaluate the patient which will happen next josemanuel    NOTE: This report was transcribed using voice recognition software. Every effort was made to ensure accuracy; however, inadvertent computerized transcription errors may be present.     Electronically signed by Ana Rosa Prado MD on 9/1/2018 at 10:12 AM

## 2018-09-01 NOTE — PROGRESS NOTES
[address] [place] [address] [facility]  [facility]  Progress Note    SUBJECTIVE:  Chief Complaint   Patient presents with    Wound Check     reports paralyzed and worried about wound infections on coccyx, legs, and feet    Other     reports not taking nay of her medications, including methadone for last 2 days. Having trouble with sanches leaking     Patient is tolerating medications. No reported adverse drug reactions. No nausea, vomiting, diarrhea. Not feeling well today. Remains afebrile. Review of systems:  As stated above in the chief complaint, otherwise negative. Medications:  Scheduled Meds:   morphine  1 mg Intravenous Once    amoxicillin-clavulanate  1 tablet Oral 2 times per day    cefdinir  300 mg Oral 2 times per day    midodrine  5 mg Oral Daily    metoprolol succinate  12.5 mg Oral Daily    sodium chloride flush  10 mL Intravenous 2 times per day    ferrous sulfate  325 mg Oral BID WC    folic acid  1 mg Oral Daily    levETIRAcetam  1,000 mg Oral BID    melatonin  3 mg Oral Nightly    therapeutic multivitamin-minerals  1 tablet Oral Daily    venlafaxine  75 mg Oral BID    sodium chloride flush  10 mL Intravenous 2 times per day    enoxaparin  40 mg Subcutaneous Daily    famotidine  20 mg Oral BID    nicotine  1 patch Transdermal Daily    gabapentin  800 mg Oral TID     Continuous Infusions:   sodium chloride 75 mL/hr at 18 0810    sodium chloride 500 mL (18 0647)     PRN Meds:ALPRAZolam, sodium chloride flush, acetaminophen, acetaminophen, hydrOXYzine, ondansetron, oxyCODONE-acetaminophen, sodium chloride flush, magnesium hydroxide, ondansetron, naloxone    OBJECTIVE:  BP 97/60   Pulse 100   Temp 98 °F (36.7 °C) (Oral)   Resp 24   Ht 5' 7\" (1.702 m)   Wt 98 lb 1.6 oz (44.5 kg)   SpO2 94%   BMI 15.36 kg/m²   Temp  Av.3 °F (36.8 °C)  Min: 97.5 °F (36.4 °C)  Max: 99.5 °F (37.5 °C)  Constitutional: The patient is asleep but arousable. Paraplegic.  She

## 2018-09-02 NOTE — PROGRESS NOTES
[facility] Progress Note    Admitting Date and Time: 8/25/2018 12:33 PM  Admit Dx: Acute cystitis with hematuria [N30.01]  Acute cystitis with hematuria [N30.01]    Subjective:    Pt has headache, tachycardic    ROS: denies fever, chills, cp, sob, n/v unless stated above.       sodium chloride  500 mL Intravenous Once    amoxicillin-clavulanate  1 tablet Oral 2 times per day    cefdinir  300 mg Oral 2 times per day    midodrine  5 mg Oral Daily    metoprolol succinate  12.5 mg Oral Daily    sodium chloride flush  10 mL Intravenous 2 times per day    ferrous sulfate  325 mg Oral BID WC    folic acid  1 mg Oral Daily    levETIRAcetam  1,000 mg Oral BID    melatonin  3 mg Oral Nightly    therapeutic multivitamin-minerals  1 tablet Oral Daily    venlafaxine  75 mg Oral BID    sodium chloride flush  10 mL Intravenous 2 times per day    enoxaparin  40 mg Subcutaneous Daily    famotidine  20 mg Oral BID    nicotine  1 patch Transdermal Daily    gabapentin  800 mg Oral TID       ALPRAZolam 1 mg TID PRN   sodium chloride flush 10 mL PRN   acetaminophen 650 mg Q4H PRN   acetaminophen 650 mg Q4H PRN   hydrOXYzine 50 mg Nightly PRN   ondansetron 4 mg Q4H PRN   oxyCODONE-acetaminophen 1 tablet Q4H PRN   sodium chloride flush 10 mL PRN   magnesium hydroxide 30 mL Daily PRN   ondansetron 4 mg Q6H PRN   naloxone 0.4 mg PRN        Objective:    /69   Pulse 120   Temp 98.5 °F (36.9 °C) (Oral)   Resp 17   Ht 5' 7\" (1.702 m)   Wt 98 lb 1.6 oz (44.5 kg)   SpO2 97%   BMI 15.36 kg/m²     General Appearance: alert and oriented to person, place and time   Skin: warm and dry, Multiple sacral decubiti ulcers looked okay with no infection, one of them has some bleeding  Head: normocephalic and atraumatic  Eyes: pupils equal, round, and reactive to light, extraocular eye movements intact, conjunctivae normal  Neck: neck supple and non tender without mass   Pulmonary/Chest: diminished mainly at the bases but clear to auscultation bilaterally- no wheezes, rales or rhonchi, normal air movement, no respiratory distress  Cardiovascular: normal rate, normal S1 and S2 and no carotid bruits  Abdomen: soft, non-tender, non-distended, normal bowel sounds, no masses or organomegaly  Extremities: no cyanosis, no clubbing and 1-2+ BLE edema, with contractures   Neurologic: no cranial nerve deficit and speech normal, abnormal gait d/t paraplegic       No results for input(s): NA, K, CL, CO2, BUN, CREATININE, GLUCOSE, CALCIUM in the last 72 hours. No results for input(s): WBC, RBC, HGB, HCT, MCV, MCH, MCHC, RDW, PLT, MPV in the last 72 hours. Updated: 08/29/18 0721    Specimen Source: Urine, clean catch     Urine Culture, Routine Physician requested workup (A)    Organism Escherichia coli (A)    Urine Culture, Routine >100,000 CFU/ml    Organism Providencia stuartii (A)    Urine Culture, Routine >100,000 CFU/ml    Organism Proteus mirabilis (A)    Urine Culture, Routine 50,000 CFU/ml    Organism Enterococcus faecium (A)    Urine Culture, Routine --    75,000 CFU/ml   Vancomycin resistant Enterococci isolated    Narrative:     Source: URINE       Site: Urine Suprapubic Cath                 Assessment:    Active Problems:    Severe protein-calorie malnutrition (HCC)    Acute cystitis with hematuria    Pain management  Resolved Problems:    * No resolved hospital problems. *      Plan:  1. Sepsis in setting of  UTI was being treated with IV antibiotics -  tachycardia -  leukocytosis       resolved -  ID on - switched to augmentin and cefdinir  2. Multiple wounds on buttocks and hips -  Positive cultures of wound (See above) wound care nurse following - speciality bed -  Percocet prn for pain - no Methadone given now or in the future - patient was instructed to go to pain clinic or Methadone clinic once discharge   3. Hypotension due to dehydration - currently resolved    4.   Severe Protein Calorie Malnutrition - appetite slowly improving   5. Normocytic Anemia - check iron studies and peripheral smear - stable no signs of active bleeding noted   6. Thrombocytosis - most likely was reactive from #5 and dehydration   7. S/p trach and colostomy - open stoma site from old trach removed last September and has not followed with anyone - She used 2x2 pads to cover the surface at home - will have general surgery evaluate her trach site for closure - colostomy functioning well  8. Methadone use unfortunately due to the discrepancy in her story about dose of methadone.  I'm not comfortable restarting the patient on methadone, patient tested negative for methadone on admission and she claimed to have taken methadone last the day before admission, she also reported a different dose of methadone from the last 30 script she had and the last dose she was given at [facility], I discussed with palliative, we will not order methadone for her. Stop morphine  9. Tachycardia, sinus, I will give IVF bolus  Dispo, no definite dispo yet, the patient burned her bridges with a lot of facilities, even her friend that she claimed would take her, is not going to take her anymore. The state will need to evaluate the patient which will happen next josemanuel    NOTE: This report was transcribed using voice recognition software. Every effort was made to ensure accuracy; however, inadvertent computerized transcription errors may be present.     Electronically signed by Bobby Schulz MD on 9/2/2018 at 10:29 AM

## 2018-09-02 NOTE — PROGRESS NOTES
Notified Dr. Rosa M Puga of high HR and MEWS of 4. This is basically baseline for patient. Will continue to monitor.

## 2018-09-02 NOTE — PROGRESS NOTES
but has multiple contractures. Skin: Warm and dry. No rashes were noted. Multiple wounds Not seen today. HEENT: Eyes show round, and reactive pupils. No jaundice. Moist mucous membranes, no ulcerations, no thrush. Suprasternal ostomy from previous tracheostomy covered. She is able to speak by covering the ostomy. Neck: Supple to movements. Chest: No wheezing, crackles, or rhonchi. Cardiovascular: S1 and S2 are rhythmic and regular. No murmurs appreciated. Abdomen: Positive bowel sounds to auscultation. Benign to palpation. Suprapubic catheter with clear urine. Extremities: Some edema and lymphedema noted in the left leg. Edema over the right foot.   Lines: peripheral    Laboratory and Tests Review:  Lab Results   Component Value Date    WBC 9.0 08/28/2018    WBC 8.0 08/27/2018    WBC 10.4 08/26/2018    HGB 9.6 (L) 08/28/2018    HCT 33.3 (L) 08/28/2018    MCV 87.2 08/28/2018     08/28/2018     Lab Results   Component Value Date    NEUTROABS 5.14 08/28/2018    NEUTROABS 4.32 08/27/2018    NEUTROABS 7.47 (H) 08/26/2018     Lab Results   Component Value Date    CRP 6.0 (H) 08/25/2018    CRP 0.3 06/02/2017    CRP 4.4 (H) 05/20/2017     No results found for: [facility]  Lab Results   Component Value Date    SEDRATE 118 (H) 08/26/2018    SEDRATE 66 (H) 06/02/2017    SEDRATE 106 (H) 05/20/2017     Lab Results   Component Value Date    ALT 9 08/25/2018    AST 12 08/25/2018    ALKPHOS 68 08/25/2018    BILITOT 0.3 08/25/2018     Lab Results   Component Value Date     08/26/2018    K 4.6 08/26/2018     08/26/2018    CO2 24 08/26/2018    BUN 17 08/26/2018    CREATININE 0.3 08/26/2018    CREATININE 0.3 08/25/2018    CREATININE 0.3 11/10/2017    GFRAA >60 08/26/2018    LABGLOM >60 08/26/2018    GLUCOSE 113 08/26/2018    PROT 8.1 08/25/2018    LABALBU 2.8 08/25/2018    CALCIUM 9.0 08/26/2018    BILITOT 0.3 08/25/2018    ALKPHOS 68 08/25/2018    AST 12 08/25/2018    ALT 9 08/25/2018

## 2018-09-03 NOTE — PROGRESS NOTES
clear to auscultation bilaterally- no wheezes, rales or rhonchi, normal air movement, no respiratory distress  Cardiovascular: normal rate, normal S1 and S2 and no carotid bruits  Abdomen: soft, non-tender, non-distended, normal bowel sounds, no masses or organomegaly  Extremities: no cyanosis, no clubbing and 1-2+ BLE edema, with contractures, left calf tenderness  Neurologic: no cranial nerve deficit and speech normal, abnormal gait d/t paraplegic       Recent Labs      09/03/18   0555   NA  138   K  3.9   CL  101   CO2  25   BUN  15   CREATININE  0.3*   GLUCOSE  95   CALCIUM  8.8       Recent Labs      09/03/18   0555   WBC  8.5   RBC  3.21*   HGB  8.3*   HCT  28.4*   MCV  88.5   MCH  25.9*   MCHC  29.2*   RDW  17.7*   PLT  426   MPV  8.6     Updated: 08/29/18 0721    Specimen Source: Urine, clean catch     Urine Culture, Routine Physician requested workup (A)    Organism Escherichia coli (A)    Urine Culture, Routine >100,000 CFU/ml    Organism Providencia stuartii (A)    Urine Culture, Routine >100,000 CFU/ml    Organism Proteus mirabilis (A)    Urine Culture, Routine 50,000 CFU/ml    Organism Enterococcus faecium (A)    Urine Culture, Routine --    75,000 CFU/ml   Vancomycin resistant Enterococci isolated    Narrative:     Source: URINE       Site: Urine Suprapubic Cath                 Assessment:    Active Problems:    Severe protein-calorie malnutrition (HCC)    Acute cystitis with hematuria    Pain management  Resolved Problems:    * No resolved hospital problems. *      Plan:  1. Sepsis in setting of  UTI was being treated with IV antibiotics -  tachycardia -  leukocytosis       resolved -  ID on - switched to augmentin and cefdinir  2.   Multiple wounds on buttocks and hips -  Positive cultures of wound (See above) wound care nurse following - speciality bed -  Percocet prn for pain - no Methadone given now or in the future - patient was instructed to go to pain clinic or Methadone clinic once discharge 3.  Hypotension due to dehydration - currently resolved    4. Severe Protein Calorie Malnutrition - appetite slowly improving   5. Normocytic Anemia -hg continues to trend down, most likely dilutional   6. Thrombocytosis - most likely was reactive from #5 and dehydration   7. S/p trach and colostomy - open stoma site from old trach removed last September and has not followed with anyone - She used 2x2 pads to cover the surface at home, colostomy functioning well  8. Methadone use unfortunately due to the discrepancy in her story about dose of methadone.  I'm not comfortable restarting the patient on methadone, patient tested negative for methadone on admission and she claimed to have taken methadone last the day before admission, she also reported a different dose of methadone from the last 30 script she had and the last dose she was given at [facility], I discussed with palliative, we will not order methadone for her. Stop morphine  9. Tachycardia, sinus, improved with IVF bolus. 10.  Lower ext edema, rule out DVT, obtain dopplers    Dispo, no definite dispo yet, the patient burned her bridges with a lot of facilities, even her friend that she claimed would take her, is not going to take her anymore. The state will need to evaluate the patient which will happen next weelk    NOTE: This report was transcribed using voice recognition software. Every effort was made to ensure accuracy; however, inadvertent computerized transcription errors may be present.     Electronically signed by Modesto Cody MD on 9/3/2018 at 9:28 AM

## 2018-09-03 NOTE — PROGRESS NOTES
[address] [place] [address] [facility]  [facility]  Progress Note    SUBJECTIVE:  Chief Complaint   Patient presents with    Wound Check     reports paralyzed and worried about wound infections on coccyx, legs, and feet    Other     reports not taking nay of her medications, including methadone for last 2 days. Having trouble with sanches leaking     Patient is tolerating medications. No reported adverse drug reactions. Admits to some nausea, denies vomiting, diarrhea. Not feeling well today. Remains afebrile. No feeling so well. Review of systems:  As stated above in the chief complaint, otherwise negative. Medications:  Scheduled Meds:   amoxicillin-clavulanate  1 tablet Oral 2 times per day    cefdinir  300 mg Oral 2 times per day    midodrine  5 mg Oral Daily    metoprolol succinate  12.5 mg Oral Daily    sodium chloride flush  10 mL Intravenous 2 times per day    ferrous sulfate  325 mg Oral BID WC    folic acid  1 mg Oral Daily    levETIRAcetam  1,000 mg Oral BID    melatonin  3 mg Oral Nightly    therapeutic multivitamin-minerals  1 tablet Oral Daily    venlafaxine  75 mg Oral BID    sodium chloride flush  10 mL Intravenous 2 times per day    enoxaparin  40 mg Subcutaneous Daily    famotidine  20 mg Oral BID    nicotine  1 patch Transdermal Daily    gabapentin  800 mg Oral TID     Continuous Infusions:    PRN Meds:mineral oil-hydrophilic petrolatum, hydrOXYzine, ALPRAZolam, sodium chloride flush, acetaminophen, acetaminophen, ondansetron, oxyCODONE-acetaminophen, sodium chloride flush, magnesium hydroxide, ondansetron, naloxone    OBJECTIVE:  /73   Pulse 108   Temp 97.8 °F (36.6 °C)   Resp 14   Ht 5' 7\" (1.702 m)   Wt 99 lb (44.9 kg)   SpO2 99%   BMI 15.51 kg/m²   Temp  Av.1 °F (36.7 °C)  Min: 97.8 °F (36.6 °C)  Max: 98.4 °F (36.9 °C)  Constitutional: The patient is asleep but arousable. Paraplegic.  She is able to move upper extremities but has multiple

## 2018-09-04 NOTE — PLAN OF CARE
Problem: Nutrition  Goal: Optimal nutrition therapy  Outcome: Ongoing  Nutrition Problem: Severe malnutrition, in context of chronic illness  Intervention: Food and/or Nutrient Delivery: Continue current diet (Continue Current Diet.   Pt continues to decline ONS at this time.  )  Nutritional Goals: PO intake >75% of meals

## 2018-09-04 NOTE — PROGRESS NOTES
Physical Therapy    Facility/Department: [facility]     NAME: Cristin Ramirez  : 1995  MRN: [id number]    Date of Service: 2018    PT treatment was attempted again today. Pt states she has been having a high HR and low BP and is scheduled for and ECHO today. At 11:30 am HR was 129 bpm and BP was 105/70, but at 9:15 am BP was 99/55 per charting. Will PT at this time until she is medically stable for OOB activity. Hilda Smith., P.T.   License Number: [id number]

## 2018-09-04 NOTE — PROGRESS NOTES
[facility] Progress Note    Admitting Date and Time: 8/25/2018 12:33 PM  Admit Dx: Acute cystitis with hematuria [N30.01]  Acute cystitis with hematuria [N30.01]    Subjective:  She denies CP ,sob or abd pain. Has generalized pain,d/w nursing no overnight issues.         midodrine  5 mg Oral Daily    metoprolol succinate  12.5 mg Oral Daily    sodium chloride flush  10 mL Intravenous 2 times per day    ferrous sulfate  325 mg Oral BID WC    folic acid  1 mg Oral Daily    levETIRAcetam  1,000 mg Oral BID    melatonin  3 mg Oral Nightly    therapeutic multivitamin-minerals  1 tablet Oral Daily    venlafaxine  75 mg Oral BID    sodium chloride flush  10 mL Intravenous 2 times per day    enoxaparin  40 mg Subcutaneous Daily    famotidine  20 mg Oral BID    nicotine  1 patch Transdermal Daily    gabapentin  800 mg Oral TID       mineral oil-hydrophilic petrolatum  BID PRN   diphenhydrAMINE 25 mg Q6H PRN   hydrOXYzine 50 mg Nightly PRN   ALPRAZolam 1 mg TID PRN   sodium chloride flush 10 mL PRN   acetaminophen 650 mg Q4H PRN   acetaminophen 650 mg Q4H PRN   ondansetron 4 mg Q4H PRN   oxyCODONE-acetaminophen 1 tablet Q4H PRN   sodium chloride flush 10 mL PRN   magnesium hydroxide 30 mL Daily PRN   ondansetron 4 mg Q6H PRN   naloxone 0.4 mg PRN        Objective:    /70   Pulse 129   Temp 98.1 °F (36.7 °C) (Oral)   Resp 20   Ht 5' 7\" (1.702 m)   Wt 99 lb (44.9 kg)   SpO2 97%   BMI 15.51 kg/m²     General Appearance: alert and oriented to person, place and time   Skin: warm and dry, Multiple sacral decubiti ulcers with no infection  Head: normocephalic and atraumatic  Eyes: pupils equal, round, and reactive to light, extraocular eye movements intact, conjunctivae normal  Neck: neck supple and non tender without mass   Pulmonary/Chest: diminished mainly at the bases but clear to auscultation bilaterally  Cardiovascular: normal rate, normal S1 and S2   Abdomen: soft, non-tender, Malnutrition - appetite slowly improving     Normocytic Anemia -monitor    Thrombocytosis - resolved     Methadone use -out pt follow in pain clinic if needed. Dispo, no definite dispo yet, the patient burned her bridges with a lot of facilities, even her friend that she claimed would take her, is not going to take her anymore. The state will need to evaluate the patient which will happen this week .     Electronically signed by Toñito Garza MD on 9/4/2018 at 6:50 PM

## 2018-09-04 NOTE — PROGRESS NOTES
Trach stoma suctioned per patient request for small amount of clear sputum. Clean gauze dressing applied to stoma site. Patient has a small area of raised redness on anterior neck. Patient states she has sensitive skin, and the area is some what itchy. She is requesting something to put on the area. Patient received Po Benadryl. RN will continue to monitor.

## 2018-09-04 NOTE — PROGRESS NOTES
Nutrition Assessment    Type and Reason for Visit: Reassess    Nutrition Recommendations: Continue Current Diet. Pt continues to decline ONS at this time. Malnutrition Assessment:  · Malnutrition Status: Meets the criteria for severe malnutrition  · Context: Chronic illness  · Findings of the 6 clinical characteristics of malnutrition (Minimum of 2 out of 6 clinical characteristics is required to make the diagnosis of moderate or severe Protein Calorie Malnutrition based on AND/ASPEN Guidelines):  1. Energy Intake-Less than or equal to 75%, greater than or equal to 1 month    2. Weight Loss-20% loss or greater,  (9 months)  3. Fat Loss-Severe subcutaneous fat loss, Orbital, Triceps  4. Muscle Loss-Severe muscle mass loss, Temples (temporalis muscle), Clavicles (pectoralis and deltoids), Scapula (trapezius)  5. Fluid Accumulation-Moderate to severe fluid accumulation, Extremities  6.  Strength-Not measured    Nutrition Diagnosis:   · Problem: Severe malnutrition, in context of chronic illness  · Etiology: related to Muscular dysfunction, Catabolic illness     Signs and symptoms:  as evidenced by Intake 50-75%, Diet history of poor intake, Presence of wounds, BMI, Weight loss, Severe loss of subcutaneous fat, Severe muscle loss, Localized or generalized fluid accumulation, Nausea    Nutrition Assessment:  · Subjective Assessment: Pt w/ family at this time, continues to eat ~50% avg of meals. Pt continues to decline ONS at this time as well. +Mild nausea at times noted.   · Nutrition-Focused Physical Findings: A&O, +teeth, tender/distended abd, +BS, +3/+4 BLE edema, -I/O's  · Wound Type: Multiple, Pressure Ulcer, Stage IV, Unstageable  · Current Nutrition Therapies:  · Oral Diet Orders: General   · Oral Diet intake: 51-75%, Select  · Oral Nutrition Supplement (ONS) Orders: None  · ONS intake: Refused  · Anthropometric Measures:  · Ht: 5' 7\" (170.2 cm)   · Current Body Wt: 99 lb (44.9 kg) (bed

## 2018-09-05 NOTE — PROGRESS NOTES
[facility] Progress Note    Admitting Date and Time: 8/25/2018 12:33 PM  Admit Dx: Acute cystitis with hematuria [N30.01]  Acute cystitis with hematuria [N30.01]    Subjective:  She denies CP ,sob or abd pain. Has generalized pain but not asking any meds ,d/w nursing no overnight issues.         midodrine  5 mg Oral Daily    metoprolol succinate  12.5 mg Oral Daily    sodium chloride flush  10 mL Intravenous 2 times per day    ferrous sulfate  325 mg Oral BID WC    folic acid  1 mg Oral Daily    levETIRAcetam  1,000 mg Oral BID    melatonin  3 mg Oral Nightly    therapeutic multivitamin-minerals  1 tablet Oral Daily    venlafaxine  75 mg Oral BID    sodium chloride flush  10 mL Intravenous 2 times per day    enoxaparin  40 mg Subcutaneous Daily    famotidine  20 mg Oral BID    nicotine  1 patch Transdermal Daily    gabapentin  800 mg Oral TID       mineral oil-hydrophilic petrolatum  BID PRN   diphenhydrAMINE 25 mg Q6H PRN   hydrOXYzine 50 mg Nightly PRN   ALPRAZolam 1 mg TID PRN   sodium chloride flush 10 mL PRN   acetaminophen 650 mg Q4H PRN   acetaminophen 650 mg Q4H PRN   ondansetron 4 mg Q4H PRN   oxyCODONE-acetaminophen 1 tablet Q4H PRN   sodium chloride flush 10 mL PRN   magnesium hydroxide 30 mL Daily PRN   ondansetron 4 mg Q6H PRN   naloxone 0.4 mg PRN        Objective:    BP (!) 121/93   Pulse 115   Temp 98.2 °F (36.8 °C) (Oral)   Resp 16   Ht 5' 7\" (1.702 m)   Wt 99 lb (44.9 kg)   SpO2 99%   BMI 15.51 kg/m²     General Appearance: alert and oriented to person, place and time   Skin: warm and dry, Multiple sacral decubiti ulcers with no infection  Head: normocephalic and atraumatic  Eyes: pupils equal, round, and reactive to light, extraocular eye movements intact, conjunctivae normal  Neck: neck supple and non tender without mass   Pulmonary/Chest: diminished mainly at the bases but clear to auscultation bilaterally  Cardiovascular: normal rate, normal S1 and S2

## 2018-09-05 NOTE — PROGRESS NOTES
and dry. No rashes were noted. Multiple wounds Not seen today. HEENT: Eyes show round, and reactive pupils. No jaundice. Moist mucous membranes, no ulcerations, no thrush. Suprasternal ostomy from previous tracheostomy covered. She is able to speak by covering the ostomy. Neck: Supple to movements. Chest: No wheezing, crackles, or rhonchi. Cardiovascular: S1 and S2 are rhythmic and regular. No murmurs appreciated. Abdomen: Positive bowel sounds to auscultation. Benign to palpation. Suprapubic catheter with clear urine. Extremities: Some edema and lymphedema noted in the left leg. Edema over the right foot.   Lines: peripheral    Laboratory and Tests Review:  Lab Results   Component Value Date    WBC 8.5 09/03/2018    WBC 9.0 08/28/2018    WBC 8.0 08/27/2018    HGB 8.3 (L) 09/03/2018    HCT 28.4 (L) 09/03/2018    MCV 88.5 09/03/2018     09/03/2018     Lab Results   Component Value Date    NEUTROABS 4.30 09/03/2018    NEUTROABS 5.14 08/28/2018    NEUTROABS 4.32 08/27/2018     Lab Results   Component Value Date    CRP 6.0 (H) 08/25/2018    CRP 0.3 06/02/2017    CRP 4.4 (H) 05/20/2017     No results found for: [facility]  Lab Results   Component Value Date    SEDRATE 118 (H) 08/26/2018    SEDRATE 66 (H) 06/02/2017    SEDRATE 106 (H) 05/20/2017     Lab Results   Component Value Date    ALT 27 09/03/2018    AST 25 09/03/2018    ALKPHOS 83 09/03/2018    BILITOT <0.2 09/03/2018     Lab Results   Component Value Date     09/03/2018    K 3.9 09/03/2018    K 4.6 08/26/2018     09/03/2018    CO2 25 09/03/2018    BUN 15 09/03/2018    CREATININE 0.3 09/03/2018    CREATININE 0.3 08/26/2018    CREATININE 0.3 08/25/2018    GFRAA >60 09/03/2018    LABGLOM >60 09/03/2018    GLUCOSE 95 09/03/2018    PROT 7.2 09/03/2018    LABALBU 2.3 09/03/2018    CALCIUM 8.8 09/03/2018    BILITOT <0.2 09/03/2018    ALKPHOS 83 09/03/2018    AST 25 09/03/2018    ALT 27 09/03/2018     Radiology:      Microbiology:   Wound buttocks

## 2018-09-06 NOTE — PROGRESS NOTES
[facility] Progress Note    Admitting Date and Time: 8/25/2018 12:33 PM  Admit Dx: Acute cystitis with hematuria [N30.01]  Acute cystitis with hematuria [N30.01]    Subjective:  She denies CP ,sob or abd pain. Has generalized pain. Frustrated. No fevers or chills.         midodrine  5 mg Oral Daily    metoprolol succinate  12.5 mg Oral Daily    sodium chloride flush  10 mL Intravenous 2 times per day    ferrous sulfate  325 mg Oral BID WC    folic acid  1 mg Oral Daily    levETIRAcetam  1,000 mg Oral BID    melatonin  3 mg Oral Nightly    therapeutic multivitamin-minerals  1 tablet Oral Daily    venlafaxine  75 mg Oral BID    sodium chloride flush  10 mL Intravenous 2 times per day    enoxaparin  40 mg Subcutaneous Daily    famotidine  20 mg Oral BID    nicotine  1 patch Transdermal Daily    gabapentin  800 mg Oral TID       mineral oil-hydrophilic petrolatum  BID PRN   diphenhydrAMINE 25 mg Q6H PRN   hydrOXYzine 50 mg Nightly PRN   ALPRAZolam 1 mg TID PRN   sodium chloride flush 10 mL PRN   acetaminophen 650 mg Q4H PRN   acetaminophen 650 mg Q4H PRN   ondansetron 4 mg Q4H PRN   oxyCODONE-acetaminophen 1 tablet Q4H PRN   sodium chloride flush 10 mL PRN   magnesium hydroxide 30 mL Daily PRN   ondansetron 4 mg Q6H PRN   naloxone 0.4 mg PRN        Objective:    BP (!) 124/92   Pulse 89   Temp 97.8 °F (36.6 °C) (Oral)   Resp 16   Ht 5' 7\" (1.702 m)   Wt 99 lb (44.9 kg)   SpO2 97%   BMI 15.51 kg/m²     General Appearance: alert and oriented to person, place and time, foul odor present  Skin: warm and dry, Multiple sacral decubiti ulcers with no infection  Head: normocephalic and atraumatic  Pulmonary/Chest: diminished mainly at the bases but clear to auscultation bilaterally  Cardiovascular: normal rate, normal S1 and S2   Abdomen: soft, non-tender, non-distended, normal bowel sounds, no masses or organomegaly  Extremities: no cyanosis, no clubbing and 1+ BLE edema, with contractures, left calf tenderness  Neurologic: no cranial nerve deficit and speech normal, abnormal gait d/t incomplete qudriplegia      No results for input(s): NA, K, CL, CO2, BUN, CREATININE, GLUCOSE, CALCIUM in the last 72 hours. No results for input(s): WBC, RBC, HGB, HCT, MCV, MCH, MCHC, RDW, PLT, MPV in the last 72 hours. Updated: 08/29/18 0721    Specimen Source: Urine, clean catch     Urine Culture, Routine Physician requested workup (A)    Organism Escherichia coli (A)    Urine Culture, Routine >100,000 CFU/ml    Organism Providencia stuartii (A)    Urine Culture, Routine >100,000 CFU/ml    Organism Proteus mirabilis (A)    Urine Culture, Routine 50,000 CFU/ml    Organism Enterococcus faecium (A)    Urine Culture, Routine --    75,000 CFU/ml   Vancomycin resistant Enterococci isolated    Narrative:     Source: URINE       Site: Urine Suprapubic Cath                 Assessment:    Active Problems:    Severe protein-calorie malnutrition (HCC)    Acute cystitis with hematuria    Pain management  Resolved Problems:    * No resolved hospital problems. *      Plan:  1. Sepsis sec to   UTI was treated with IV antibiotics -  now resolved  ,  ID on case. Off abx  2. Hx of traumatic injury with incomplete quadriplegia,  S/p tracheostomy, still with opening  3. Multiple wounds on buttocks and hips -  Positive cultures of wound , wound care nurse following - speciality bed -  Percocet prn for pain - no Methadone given now or in the future - patient was instructed to go to pain clinic or Methadone clinic once discharge   4. Hypotension/tachycardia sec to infection and opiate abuse  - stable  5. Severe Protein Calorie Malnutrition - appetite slowly improving. Frequently refusing to eat. 6. Normocytic Anemia - secondary to chronic malnutrition and poor oral intake with recurrent infections  7. Thrombocytosis - resolved  8. Methadone use -out pt follow in pain clinic if needed.     Disp: Patient is medically stable

## 2018-09-06 NOTE — PLAN OF CARE
Problem: Falls - Risk of:  Goal: Will remain free from falls  Will remain free from falls   Outcome: Ongoing      Problem: Risk for Impaired Skin Integrity  Goal: Tissue integrity - skin and mucous membranes  Structural intactness and normal physiological function of skin and  mucous membranes. Outcome: Not Met This Shift  Pt refusing wound dressing changes and turns.

## 2018-09-06 NOTE — PROGRESS NOTES
[address] [place] [address] [facility]  [facility]  Progress Note    SUBJECTIVE:  Chief Complaint   Patient presents with    Wound Check     reports paralyzed and worried about wound infections on coccyx, legs, and feet    Other     reports not taking nay of her medications, including methadone for last 2 days. Having trouble with sanches leaking     Patient is tolerating medications. No reported adverse drug reactions. Admits to some nausea, denies vomiting, diarrhea. Not feeling well today. Has no specific complaints, however. Remains afebrile. No feeling so well. Appetite is good. Review of systems:  As stated above in the chief complaint, otherwise negative. Medications:  Scheduled Meds:   midodrine  5 mg Oral Daily    metoprolol succinate  12.5 mg Oral Daily    sodium chloride flush  10 mL Intravenous 2 times per day    ferrous sulfate  325 mg Oral BID WC    folic acid  1 mg Oral Daily    levETIRAcetam  1,000 mg Oral BID    melatonin  3 mg Oral Nightly    therapeutic multivitamin-minerals  1 tablet Oral Daily    venlafaxine  75 mg Oral BID    sodium chloride flush  10 mL Intravenous 2 times per day    enoxaparin  40 mg Subcutaneous Daily    famotidine  20 mg Oral BID    nicotine  1 patch Transdermal Daily    gabapentin  800 mg Oral TID     Continuous Infusions:    PRN Meds:mineral oil-hydrophilic petrolatum, diphenhydrAMINE, hydrOXYzine, ALPRAZolam, sodium chloride flush, acetaminophen, acetaminophen, ondansetron, oxyCODONE-acetaminophen, sodium chloride flush, magnesium hydroxide, ondansetron, naloxone    OBJECTIVE:  BP (!) 124/92   Pulse 89   Temp 97.8 °F (36.6 °C) (Oral)   Resp 16   Ht 5' 7\" (1.702 m)   Wt 99 lb (44.9 kg)   SpO2 97%   BMI 15.51 kg/m²   Temp  Av °F (36.7 °C)  Min: 97.8 °F (36.6 °C)  Max: 98.1 °F (36.7 °C)  Constitutional: The patient is asleep but arousable. Paraplegic. She is able to move upper extremities but has multiple contractures.    Skin: Warm mixed GNR, GNR  Wound left hip mixed GNR, Proteus, mixed GPO  Wound sacrum mixed GPO, Proteus  Wound to left posterior thigh Proteus, GNR, alpha Streptococcus M_SA  Wound left buttock mixed Proteus, non hemolytic Streptococcus  Urine culture >100K Providencia stuartii, 50K Proteus mirabilis, >100K E. Coli, 50K VREnterococcus faecium  Blood cultures 8/25/18 negative so far    ASSESSMENT:  · Probable UTI with sepsis associated to suprapubic catheter, treated with a minimum of 7 days of antibiotics  · Leukocytosis secondary to UTI, resolved  · History of Guillain-Barré syndrome, resulting in incomplete quadriplegia  · Multiple decubitus ulcers on ischia, coccyx, buttocks and right heel. Clinically not infected.  Colonized with multiple organisms    PLAN:  · Contact isolation  · Patient can be discharged from ID standpoint, no changes    Brianne Hurtado  2:43 PM  9/6/2018

## 2018-09-07 NOTE — PROGRESS NOTES
Patient was found by fellow nurse Alexis Escalona less responsive vitals were then checked BP systolic was in the low 56Y RRT was then called.

## 2018-09-07 NOTE — SIGNIFICANT EVENT
See prior note  Sp IV fluids, midrodine, solumedrol, restarting florinef  Holding opiates    BP now recheck few hours later 120/90-- lower HR 90  BP (!) 124/92   Pulse 89   Temp 97.8 °F (36.6 °C) (Oral)   Resp 16   Ht 5' 7\" (1.702 m)   Wt 99 lb (44.9 kg)   SpO2 97%   BMI 15.51 kg/m²     Still no fever, wBC returned normal-- defer restart abx tonight, awaiting esr/crp/procalcitonin  hgb 8.9 -- doubt blood loss    +d dimer- but lesions noted, ?significance, clinically doubt PE and improved now, BP drop tends to be labile  Autonomic instability likely from guillian barre  +/- med effects which can contribute to low BP or tachycardia (noted on betablocker --but wasn't getting-- then on midrodine only given in early am, ?baclofen etc.  ?tachycardia with hydroxyzine etc.. )
and they felt she wasn't in flourid withdrawals. \"      Review all meds, ? pharmacy consult to review case-- will discuss with primary team in am  I/Os reviewed,-- looks like ?excessive UO sometimes more than one liter per shift -- dec PO intake  ? dehydration, off IV fluids for days  - with some coexisting diabetes inspitdius (but Na+ not elevated) or other excessive urinary wasting.  (aldosterone deficieny -- off florinef)  Will bolus fluids and put back on maintenance IV-- ?add NG water boluses. bp persisting low  ? adrenal insufficiency, in 2016 used to be on florinef prior to admission this wasn't restarted?  Why?-- will resume florinef 0.1 daily  Give one dose solumedrol 40mg IV x1 now  45min CCT

## 2018-09-07 NOTE — PROGRESS NOTES
Stoma suctioned per patient request for small amount of clearish sputum. Gauze dressing changed to stoma site. Clean, dry and intact. Patient refused turn and dressing changes.

## 2018-09-07 NOTE — PROGRESS NOTES
mixed Proteus, non hemolytic Streptococcus  Urine culture >100K Providencia stuartii, 50K Proteus mirabilis, >100K E. Coli, 50K VREnterococcus faecium  Blood cultures 8/25/18 negative so far  Stool positive VRE    ASSESSMENT:  · Probable UTI with sepsis associated to suprapubic catheter, treated with a minimum of 7 days of antibiotics  · Leukocytosis secondary to UTI, resolved  · History of Guillain-Barré syndrome, resulting in incomplete quadriplegia  · Multiple decubitus ulcers on ischia, coccyx, buttocks and right heel. Clinically not infected.  Colonized with multiple organisms    PLAN:  · Contact isolation  · Patient can be discharged from ID standpoint, no changes    Ayah Barnes  2:27 PM  9/7/2018

## 2018-09-07 NOTE — PROGRESS NOTES
Talked with Dr. Doreen Mccall during RRT regarding patient's status including when narcan was administered, I&O, last set of labs, fluid status and information regarding patient's history; see new orders per Dr. Doreen Mccall

## 2018-09-07 NOTE — PROGRESS NOTES
Nutrition Assessment    Type and Reason for Visit: Reassess    Nutrition Recommendations: Continue current diet. Patient continues to refuse ONS. Recommend considering alternate source of nutrition if PO intakes continue to be inadequate. Malnutrition Assessment:  · Malnutrition Status: Meets the criteria for severe malnutrition  · Context: Chronic illness  · Findings of the 6 clinical characteristics of malnutrition (Minimum of 2 out of 6 clinical characteristics is required to make the diagnosis of moderate or severe Protein Calorie Malnutrition based on AND/ASPEN Guidelines):  1. Energy Intake-Less than or equal to 75%, greater than or equal to 1 month    2. Weight Loss-20% loss or greater,  (9 months)  3. Fat Loss-Severe subcutaneous fat loss, Orbital, Triceps  4. Muscle Loss-Severe muscle mass loss, Temples (temporalis muscle), Clavicles (pectoralis and deltoids), Scapula (trapezius)  5. Fluid Accumulation-Moderate to severe fluid accumulation, Extremities  6.   Strength-Not measured    Nutrition Diagnosis:   · Problem: Severe malnutrition, in context of chronic illness  · Etiology: related to Catabolic illness, Muscular dysfunction     Signs and symptoms:  as evidenced by Intake 25-50%, Diet history of poor intake, Presence of wounds, BMI, Weight loss, Severe loss of subcutaneous fat, Localized or generalized fluid accumulation, Severe muscle loss    Nutrition Assessment:  · Subjective Assessment: s/p RRT 9/6  · Nutrition-Focused Physical Findings: abd distention, active BS, colostomy, stoma, paraplegic, +3-4 non-pitting edema, -I/O's 16.9L  · Wound Type: Multiple, Open Wounds, Pressure Ulcer, Stage IV, Unstageable  · Current Nutrition Therapies:  · Oral Diet Orders: General   · Oral Diet intake: 26-50% (average, but varies at times)  · Oral Nutrition Supplement (ONS) Orders: None  · ONS intake: Refused  · Anthropometric Measures:  · Ht: 5' 7\" (170.2 cm)   · Current Body Wt: 99 lb (44.9 kg) (9/5 bed scale)  · Admission Body Wt: 98 lb (44.5 kg) (bed 8/27)  · Usual Body Wt: 128 lb (58.1 kg) (stated 12/22/17 per EMR, pt unsure)  · % Weight Change: wt stable during admit  · Ideal Body Wt: 128 lb (58.1 kg) (adjusted d/t paraplegia), % Ideal Body 77%  · body weight adjusted for Paraplegia  · BMI Classification: BMI <18.5 Underweight  · Comparative Standards (Estimated Nutrition Needs):  · Estimated Daily Total Kcal: 8868-1189  · Estimated Daily Protein (g): 70-90    Nutrition Risk Level: High    Nutrition Interventions:   Continue current diet  Continued Inpatient Monitoring, Education not appropriate at this time, Coordination of Care (Pt continues to refuse ONS. Reinforced the importance of adequate protein consumption for wound healing.)    Nutrition Evaluation:   · Evaluation: Goals set   · Goals: Pt to consume >50% most meals    · Monitoring: Meal Intake, Diet Tolerance, Skin Integrity, Wound Healing, Fluid Balance, Ascites/Edema, Weight, Comparative Standards, Pertinent Labs    See Adult Nutrition Doc Flowsheet for more detail.      Electronically signed by Liborio Castellanos, MS, RD, LD on 9/7/18 at 12:04 PM    Contact Number: [id number]

## 2018-09-08 NOTE — PROGRESS NOTES
[address] [place] [address] [facility]  [facility]  Progress Note    SUBJECTIVE:  Chief Complaint   Patient presents with    Wound Check     reports paralyzed and worried about wound infections on coccyx, legs, and feet    Other     reports not taking nay of her medications, including methadone for last 2 days. Having trouble with sanches leaking       No n/v. Stool per ostomy   Has no specific complaints, however. Remains afebrile. Not feeling well. Patient claims that her appetite is good. Review of systems:  As stated above in the chief complaint, otherwise negative. Medications:  Scheduled Meds:   midodrine  5 mg Oral BID WC    metoprolol succinate  12.5 mg Oral Daily    folic acid  1 mg Oral Daily    levETIRAcetam  1,000 mg Oral BID    melatonin  3 mg Oral Nightly    therapeutic multivitamin-minerals  1 tablet Oral Daily    venlafaxine  75 mg Oral BID    sodium chloride flush  10 mL Intravenous 2 times per day    enoxaparin  40 mg Subcutaneous Daily    famotidine  20 mg Oral BID    nicotine  1 patch Transdermal Daily    gabapentin  800 mg Oral TID     Continuous Infusions:   sodium chloride 50 mL/hr at 18     PRN Meds:oxyCODONE-acetaminophen, mineral oil-hydrophilic petrolatum, diphenhydrAMINE, hydrOXYzine, ALPRAZolam, acetaminophen, ondansetron, sodium chloride flush, magnesium hydroxide, ondansetron, naloxone    OBJECTIVE:  /84   Pulse 80   Temp 98 °F (36.7 °C) (Oral)   Resp 18   Ht 5' 7\" (1.702 m)   Wt 98 lb (44.5 kg)   SpO2 97%   BMI 15.35 kg/m²   Temp  Av.9 °F (36.6 °C)  Min: 97.8 °F (36.6 °C)  Max: 98 °F (36.7 °C)  Constitutional: The patient is asleep but arousable. Paraplegic. She is able to move upper extremities but has multiple contractures. Skin: Warm and dry. No rashes were noted. Multiple wounds Not seen today. HEENT: No jaundice. Neck: Suprasternal ostomy covered. Chest: No wheezing, crackles, or rhonchi.    Cardiovascular: S1 and S2 are with sepsis associated to suprapubic catheter, treated with a minimum of 7 days of antibiotics  · Leukocytosis secondary to UTI, resolved  · History of Guillain-Barré syndrome, resulting in incomplete quadriplegia  · Multiple decubitus ulcers on ischia, coccyx, buttocks and right heel. Clinically not infected. Colonized with multiple organisms    PLAN:  · Contact isolation  · Patient can be discharged from ID standpoint, no changes    April [address] [facility],[address]  1:27 PM  9/8/2018    Patient seen and examined. I had a face to face encounter with the patient. Agree with exam, assessment and plan as outlined above. Addition and corrections were done as deemed appropriate. My exam and plan include: No new changes. No new recommendations. We will be seeing her on an as-needed basis.     Chrystal Pool  9/8/2018

## 2018-09-08 NOTE — PROGRESS NOTES
Viktoriya Thompson Hospitalist   Progress Note    Admitting Date and Time: 8/25/2018 12:33 PM  Admit Dx: Acute cystitis with hematuria [N30.01]  Acute cystitis with hematuria [N30.01]    Subjective:    Pt complaining of pain and is not happy about Percocet 5 mg. She insisted to increase her Percocet to 10. She also requests methadone. She states that she always gets it   from nursing home. She is upset that doctors do not order it here. She refused to be examined.      midodrine  5 mg Oral BID WC    metoprolol succinate  12.5 mg Oral Daily    folic acid  1 mg Oral Daily    levETIRAcetam  1,000 mg Oral BID    melatonin  3 mg Oral Nightly    therapeutic multivitamin-minerals  1 tablet Oral Daily    venlafaxine  75 mg Oral BID    sodium chloride flush  10 mL Intravenous 2 times per day    enoxaparin  40 mg Subcutaneous Daily    famotidine  20 mg Oral BID    nicotine  1 patch Transdermal Daily    gabapentin  800 mg Oral TID       oxyCODONE-acetaminophen 1 tablet Q4H PRN   mineral oil-hydrophilic petrolatum  BID PRN   diphenhydrAMINE 25 mg Q6H PRN   hydrOXYzine 50 mg Nightly PRN   ALPRAZolam 1 mg TID PRN   acetaminophen 650 mg Q4H PRN   ondansetron 4 mg Q4H PRN   sodium chloride flush 10 mL PRN   magnesium hydroxide 30 mL Daily PRN   ondansetron 4 mg Q6H PRN   naloxone 0.4 mg PRN        Objective:    /84   Pulse 80   Temp 98 °F (36.7 °C) (Oral)   Resp 18   Ht 5' 7\" (1.702 m)   Wt 98 lb (44.5 kg)   SpO2 97%   BMI 15.35 kg/m²     Patient refused physical exam.     Recent Labs      09/07/18   0105   NA  135   K  4.9   CL  97*   CO2  28   BUN  20   CREATININE  0.4*   GLUCOSE  94   CALCIUM  8.6       Recent Labs      09/07/18   0105   WBC  8.7   RBC  3.51   HGB  8.9*   HCT  30.5*   MCV  86.9   MCH  25.4*   MCHC  29.2*   RDW  17.7*   PLT  526*   MPV  8.5      Specimen: Stool Updated: 09/04/18 0650    VRE Culture --    Organism Enterococcus faecium (A)    VRE Culture Vancomycin resistant Enterococci isolated      Specimen: Urine, clean catch Updated: 08/29/18 0721    Urine Culture, Routine Physician requested workup (A)    Organism Escherichia coli (A)    Urine Culture, Routine >100,000 CFU/ml    Organism Providencia stuartii (A)    Urine Culture, Routine >100,000 CFU/ml    Organism Proteus mirabilis (A)    Urine Culture, Routine 50,000 CFU/ml    Organism Enterococcus faecium (A)    Urine Culture, Routine --    75,000 CFU/ml   Vancomycin resistant Enterococci isolated        Radiology:   US DUP LOWER EXTREMITIES BILATERAL VENOUS   Final Result   No evidence of deep venous thrombosis in the lower   extremities bilaterally. XR CHEST PORTABLE   Final Result      Discrete areas of increased density in the left base can represent   minimal subpleural infiltrate in the left posterior costophrenic   sulcus. Small linear subsegmental atelectasis in the right base. XR CHEST PORTABLE   Final Result      1. No acute pleuroparenchymal disease. Assessment:    Active Problems:    Severe protein-calorie malnutrition ([address]    Acute cystitis with hematuria    Pain management  Resolved Problems:    * No resolved hospital problems. *      Plan:  1. Sepsis from UTI: treated with abx for 7 days now observe off abx. 2. Multiple wounds on buttocks and hips: not infected. Continue local wound care. 3. Hypotension: related to narcotics. BP high on florinef so taken off. On midodrine. BP stable. 4. Severe protein calorie malnutrition: dietician consulted. 5. Normocytic anemia from chronic disease: H&H stable. 6. Thrombocytosis: reactive   7. ?Methadone use: Cannot verify who prescribed methadone. She has to follow with pain clinic outpatient.     DVT prophylaxis: lovenox    Dispo: waiting placement - appreciate  assistance    Electronically signed by Eriberto Richardson MD on 9/8/2018 at 2:38 PM

## 2018-09-09 NOTE — PROGRESS NOTES
During the previous evening hours, pt had 2 Male visitors one of which brought a pizza for pt. . Stayed a short while, then left. .. The second Male visitior was in the room with pt. And was heard to ask the pt. \"how much do you need? \" She responded 50. . The Man had a wad of money he took out of his pocket and left some with the pt. He then left the hospital.. Why this concerns me is that the pt developed hypotension during the night after the visitiors and was more drowsy. ..

## 2018-09-09 NOTE — PROGRESS NOTES
Pt alert, oriented, responding appropriately. . Skin warm, dry IV bolus started and po fluids pushed.

## 2018-09-09 NOTE — PLAN OF CARE
Problem: Pain:  Goal: Pain level will decrease  Pain level will decrease   Outcome: Ongoing      Problem: Risk for Impaired Skin Integrity  Goal: Tissue integrity - skin and mucous membranes  Structural intactness and normal physiological function of skin and  mucous membranes.    Outcome: Ongoing

## 2018-09-10 NOTE — PLAN OF CARE
Problem: Pain:  Goal: Control of chronic pain  Control of chronic pain   Outcome: Ongoing      Problem: Falls - Risk of:  Goal: Will remain free from falls  Will remain free from falls   Outcome: Met This Shift      Problem: Risk for Impaired Skin Integrity  Goal: Tissue integrity - skin and mucous membranes  Structural intactness and normal physiological function of skin and  mucous membranes.    Outcome: Not Met This Shift

## 2018-09-10 NOTE — PROGRESS NOTES
non-tender, non-distended, normal bowel sounds, no masses or organomegaly  Extremities: no cyanosis, no clubbing and 1-2+ BLE edema, with contractures, left calf tenderness  Neurologic: no cranial nerve deficit and speech normal, abnormal gait d/t paraplegic      No results for input(s): NA, K, CL, CO2, BUN, CREATININE, GLUCOSE, CALCIUM in the last 72 hours. No results for input(s): WBC, RBC, HGB, HCT, MCV, MCH, MCHC, RDW, PLT, MPV in the last 72 hours. Assessment:    Active Problems:    Severe protein-calorie malnutrition ([address]    Acute cystitis with hematuria    Pain management    Hypotension    Thrombocytosis (HCC)  Resolved Problems:    * No resolved hospital problems. *      Plan:  1. Sepsis in setting of  UTI was being treated with IV antibiotics -  tachycardia -  leukocytosis       resolved -  ID on - finished abx course  2. Multiple wounds on buttocks and hips -  Positive cultures of wound (See above) wound care nurse following - speciality bed -  Percocet prn for pain - no Methadone given now or in the future - patient was instructed to go to pain clinic or Methadone clinic once discharge   3. Hypotension due to dehydration - currently resolved    4. Severe Protein Calorie Malnutrition - appetite slowly improving   5. Normocytic Anemia -hg continues to trend down, most likely dilutional   6. Thrombocytosis - most likely was reactive from #5 and dehydration   7. S/p trach and colostomy - open stoma site from old trach removed last September and has not followed with anyone - She used 2x2 pads to cover the surface at home, colostomy functioning well  8. Methadone No methdone to be prescribed for her  9. Tachycardia, sinus, improved with IVF bolus. 10.  Lower ext edema, rule out DVT, obtain dopplers     Dispo, no definite dispo yet, the patient burned her bridges with a lot of facilities, even her friend that she claimed would take her, is not going to take her anymore.  The state evaluated

## 2018-09-11 NOTE — DISCHARGE SUMMARY
hydrOXYzine (ATARAX) 50 MG tablet Take 50 mg by mouth nightly as needed (insomnia)      Cranberry 450 MG CAPS Take by mouth daily LD 12/20/2017      Menthol, Topical Analgesic, (BIOFREEZE EX) Apply topically 2 times daily as needed      phenazopyridine (PYRIDIUM) 100 MG tablet Take 100 mg by mouth 3 times daily as needed for Pain      gabapentin (NEURONTIN) 300 MG capsule Take 800 mg by mouth 3 times daily. Instructed to take morning of surgery with a sip of water . ALPRAZolam (XANAX) 1 MG tablet Take 1 mg by mouth 3 times daily  Instructed to take morning of surgery with a sip of water.       loratadine (CLARITIN) 10 MG tablet Take 10 mg by mouth daily      acetaminophen (TYLENOL) 325 MG tablet Take 650 mg by mouth every 4 hours as needed for Pain or Fever      ondansetron (ZOFRAN) 4 MG tablet Take 4 mg by mouth every 4 hours as needed for Nausea or Vomiting      vitamin D (ERGOCALCIFEROL) 60992 UNITS CAPS capsule Take 50,000 Units by mouth once a week Takes on Mondays   LD 12/18/2017      famotidine (PEPCID) 10 MG tablet Take 10 mg by mouth daily Instructed to take morning of surgery with a sip of water      metoprolol succinate (TOPROL XL) 25 MG extended release tablet Take 12.5 mg by mouth daily Instructed to take morning of surgery with a sip of water      levETIRAcetam (KEPPRA) 1000 MG tablet Take 1,000 mg by mouth 2 times daily Instructed to take morning of surgery with a sip of water      folic acid (FOLVITE) 1 MG tablet Take 1 mg by mouth daily      venlafaxine (EFFEXOR) 75 MG tablet Take 75 mg by mouth 2 times daily Instructed to take morning of surgery with a sip of water      melatonin 3 MG TABS tablet Take 3 mg by mouth nightly  Refills: 0      midodrine (PROAMATINE) 5 MG tablet Take 5 mg by mouth daily Instructed to take morning of surgery with a sip of water  Refills: 0      ferrous sulfate 325 (65 FE) MG tablet Take 1 tablet by mouth 2 times daily (with meals)  Qty: 60 tablet, Refills: 0 review, etc.    Signed:  Electronically signed by Carolyn Manuel MD on 9/11/2018 at 3:08 PM

## 2018-09-19 NOTE — CODE DOCUMENTATION
0234 EMS ON SCENE, CPR ALREADY IN PROGRESS, LAST KNOWN WELL 0000, 4 ROUNDS OF EPI BY EMS, 2 MG NARCAN GIVEN, I/O ESTABLISHED, 1 SHOCK DELIVERED PTA.

## 2018-09-26 PROBLEM — N39.0 UTI (URINARY TRACT INFECTION): Status: RESOLVED | Noted: 2017-05-17 | Resolved: 2018-09-26
